# Patient Record
Sex: FEMALE | Race: WHITE | NOT HISPANIC OR LATINO | Employment: FULL TIME | URBAN - METROPOLITAN AREA
[De-identification: names, ages, dates, MRNs, and addresses within clinical notes are randomized per-mention and may not be internally consistent; named-entity substitution may affect disease eponyms.]

---

## 2023-10-02 ENCOUNTER — OFFICE VISIT (OUTPATIENT)
Dept: PRIMARY CARE | Facility: CLINIC | Age: 53
End: 2023-10-02
Payer: COMMERCIAL

## 2023-10-02 VITALS — DIASTOLIC BLOOD PRESSURE: 76 MMHG | HEART RATE: 76 BPM | RESPIRATION RATE: 14 BRPM | SYSTOLIC BLOOD PRESSURE: 126 MMHG

## 2023-10-02 DIAGNOSIS — M25.561 CHRONIC PAIN OF RIGHT KNEE: ICD-10-CM

## 2023-10-02 DIAGNOSIS — G89.29 CHRONIC PAIN OF RIGHT KNEE: ICD-10-CM

## 2023-10-02 DIAGNOSIS — B35.1 ONYCHOMYCOSIS: Primary | ICD-10-CM

## 2023-10-02 DIAGNOSIS — J01.00 ACUTE NON-RECURRENT MAXILLARY SINUSITIS: ICD-10-CM

## 2023-10-02 PROCEDURE — 96372 THER/PROPH/DIAG INJ SC/IM: CPT | Performed by: FAMILY MEDICINE

## 2023-10-02 PROCEDURE — 99214 OFFICE O/P EST MOD 30 MIN: CPT | Performed by: FAMILY MEDICINE

## 2023-10-02 RX ORDER — TERBINAFINE HYDROCHLORIDE 250 MG/1
250 TABLET ORAL DAILY
Qty: 28 TABLET | Refills: 1 | Status: SHIPPED | OUTPATIENT
Start: 2023-10-02 | End: 2024-01-24 | Stop reason: ALTCHOICE

## 2023-10-02 RX ORDER — TRIAMCINOLONE ACETONIDE 40 MG/ML
40 INJECTION, SUSPENSION INTRA-ARTICULAR; INTRAMUSCULAR ONCE
Status: COMPLETED | OUTPATIENT
Start: 2023-10-02 | End: 2023-10-02

## 2023-10-02 RX ORDER — MELOXICAM 15 MG/1
15 TABLET ORAL DAILY
Qty: 30 TABLET | Refills: 11 | Status: SHIPPED | OUTPATIENT
Start: 2023-10-02 | End: 2024-10-01

## 2023-10-02 RX ORDER — AMOXICILLIN AND CLAVULANATE POTASSIUM 875; 125 MG/1; MG/1
875 TABLET, FILM COATED ORAL 2 TIMES DAILY
Qty: 20 TABLET | Refills: 0 | Status: SHIPPED | OUTPATIENT
Start: 2023-10-02 | End: 2024-01-24 | Stop reason: ALTCHOICE

## 2023-10-02 RX ORDER — TERBINAFINE HYDROCHLORIDE 250 MG/1
250 TABLET ORAL DAILY
Qty: 14 TABLET | Refills: 0 | Status: SHIPPED | OUTPATIENT
Start: 2023-10-02 | End: 2023-10-02 | Stop reason: ALTCHOICE

## 2023-10-02 RX ADMIN — TRIAMCINOLONE ACETONIDE 40 MG: 40 INJECTION, SUSPENSION INTRA-ARTICULAR; INTRAMUSCULAR at 22:36

## 2023-10-02 NOTE — PROGRESS NOTES
Subjective   Patient ID: Sydney Juares is a 52 y.o. female who presents for sinus,     HPI   Patient started to have sinus congestion, facial ache, yellowish nasal discharge and mild dull headache 7 days ago. Patient also had cough with yellow phlegm. Normal hearing. No body aches, loss of smell/tastes,  fever, chills. Patient denies having shortness of breath, wheezing, chest pain, heart palpitation. No  nausea, vomiting or abdominal pain. Patient had normal appetite. No stiff neck, dizziness or imbalance. Symptoms persisted today. B/l toenail fungal infection improved slight;y with terbinafine for 4 weeks.  No abd pain or jaundice. Rt knee dull pain was partially controlled with mobic. No GI upset. Pt requested a cortisone shot for pain relief.         Review of Systems    Objective   /76   Pulse 76   Resp 14     Physical Exam  Mild distress. Eyes: EMOI, No conjunctival erythema. Left  maxillary  area tenderness upon palpation. There was yellowish nasal discharge. Mildly erythematous pharynx. No cervical lymph node tenderness or enlargement. Neck is supple. Lungs: CTA b/l, Heart: RRR. Abdomen: soft, no tenderness. Bowel sound: normal. Mild tenderness at rt knee, no effusion or warmth. Patient walks with a good balance. + b/l toenail fungal infection     Assessment/Plan   Problem List Items Addressed This Visit             ICD-10-CM    Acute maxillary sinusitis J01.00     Acute sinusitis: Antibiotics as above. Saline nasal spray. Tylenol prn for fever or ache. Increase fluid intake. If symptoms do not improve in 3-4  days, call office.           Relevant Medications    amoxicillin-pot clavulanate (Augmentin) 875-125 mg tablet    Onychomycosis - Primary B35.1     Terbinafine as dir. Caution of liver damage. No eoth or otc meds. Rtc for cpe         Relevant Medications    terbinafine (LamISIL) 250 mg tablet    Chronic pain of right knee M25.561, G89.29     Partially controlled. Cont mobic. Kenolog 40mg IM  for relief. Call office if symptoms do not improve  in 3 days           Relevant Medications    meloxicam (Mobic) 15 mg tablet    triamcinolone acetonide (Kenalog-40) injection 40 mg

## 2023-10-03 NOTE — ASSESSMENT & PLAN NOTE
Acute sinusitis: Antibiotics as above. Saline nasal spray. Tylenol prn for fever or ache. Increase fluid intake. If symptoms do not improve in 3-4  days, call office.

## 2023-10-03 NOTE — ASSESSMENT & PLAN NOTE
Partially controlled. Cont mobic. Kenolog 40mg IM for relief. Call office if symptoms do not improve  in 3 days

## 2024-01-24 ENCOUNTER — OFFICE VISIT (OUTPATIENT)
Dept: PRIMARY CARE | Facility: CLINIC | Age: 54
End: 2024-01-24
Payer: COMMERCIAL

## 2024-01-24 VITALS — RESPIRATION RATE: 14 BRPM | DIASTOLIC BLOOD PRESSURE: 78 MMHG | HEART RATE: 78 BPM | SYSTOLIC BLOOD PRESSURE: 138 MMHG

## 2024-01-24 DIAGNOSIS — J45.30 MILD PERSISTENT REACTIVE AIRWAY DISEASE WITHOUT COMPLICATION (HHS-HCC): Primary | ICD-10-CM

## 2024-01-24 DIAGNOSIS — Z12.11 ENCOUNTER FOR SCREENING FOR MALIGNANT NEOPLASM OF COLON: ICD-10-CM

## 2024-01-24 PROCEDURE — 96372 THER/PROPH/DIAG INJ SC/IM: CPT | Performed by: FAMILY MEDICINE

## 2024-01-24 PROCEDURE — 99213 OFFICE O/P EST LOW 20 MIN: CPT | Performed by: FAMILY MEDICINE

## 2024-01-24 RX ORDER — TRIAMCINOLONE ACETONIDE 40 MG/ML
80 INJECTION, SUSPENSION INTRA-ARTICULAR; INTRAMUSCULAR ONCE
Status: COMPLETED | OUTPATIENT
Start: 2024-01-24 | End: 2024-01-24

## 2024-01-24 RX ORDER — ALBUTEROL SULFATE 90 UG/1
2 AEROSOL, METERED RESPIRATORY (INHALATION) EVERY 4 HOURS PRN
Qty: 8.5 G | Refills: 0 | Status: SHIPPED | OUTPATIENT
Start: 2024-01-24 | End: 2025-01-23

## 2024-01-24 RX ORDER — CEFDINIR 300 MG/1
300 CAPSULE ORAL 2 TIMES DAILY
Qty: 20 CAPSULE | Refills: 0 | Status: SHIPPED | OUTPATIENT
Start: 2024-01-24 | End: 2024-02-03

## 2024-01-24 RX ORDER — PREDNISONE 20 MG/1
40 TABLET ORAL DAILY
Qty: 10 TABLET | Refills: 0 | Status: SHIPPED | OUTPATIENT
Start: 2024-01-24

## 2024-01-24 RX ADMIN — TRIAMCINOLONE ACETONIDE 80 MG: 40 INJECTION, SUSPENSION INTRA-ARTICULAR; INTRAMUSCULAR at 09:16

## 2024-01-24 NOTE — ASSESSMENT & PLAN NOTE
Kenolog  80mg IM,  Start antibiotics and albuterol inhaler  as above. Prednisone 40mg po qd for 5 days. call office if symptoms do not improve in 3-4 days.

## 2024-01-24 NOTE — PROGRESS NOTES
Subjective   Patient ID: Sydney Juares is a 53 y.o. female who presents for sinus, cough and wheezing for 3 weeks    HPI   Patient started to have sinus congestion, yellowish nasal discharge and mild dull headache 3 weeks ago. Patient also had wheezing, cough with yellow phlegm. Normal hearing. No body aches, loss of smell/tastes,  fever, chills. Patient denies having shortness of breath, wheezing, chest pain, heart palpitation. No  nausea, vomiting or abdominal pain. Patient had normal appetite. No stiff neck, dizziness or imbalance. Symptoms persisted today.         Review of Systems    Objective   /78   Pulse 78   Resp 14     Physical Exam  No  distress, well groomed, eyes: PERRLA, No sclera icterus. No sinus tenderness, + yellow nasal discharge, neck: supple, no cervical lymphadenopathy, lungs: wheezing b/l, no rales, heart: RRR, cap refill was less than 2 secs, no cyanosis, clubbing or LE edema, abd: soft, no tenderness, BS+, Good balance.     Assessment/Plan   Problem List Items Addressed This Visit             ICD-10-CM    Mild persistent reactive airway disease without complication - Primary J45.30     Kenolog  80mg IM,  Start antibiotics and albuterol inhaler  as above. Prednisone 40mg po qd for 5 days. call office if symptoms do not improve in 3-4 days.           Relevant Medications    cefdinir (Omnicef) 300 mg capsule    albuterol (ProAir HFA) 90 mcg/actuation inhaler    triamcinolone acetonide (Kenalog-40) injection 80 mg    predniSONE (Deltasone) 20 mg tablet     Other Visit Diagnoses         Codes    Encounter for screening for malignant neoplasm of colon     Z12.11    Relevant Orders    Cologuard® colon cancer screening

## 2024-05-22 ENCOUNTER — OFFICE VISIT (OUTPATIENT)
Dept: PRIMARY CARE | Facility: CLINIC | Age: 54
End: 2024-05-22
Payer: COMMERCIAL

## 2024-05-22 VITALS — RESPIRATION RATE: 14 BRPM | HEART RATE: 66 BPM | DIASTOLIC BLOOD PRESSURE: 68 MMHG | SYSTOLIC BLOOD PRESSURE: 124 MMHG

## 2024-05-22 DIAGNOSIS — Z12.31 ENCOUNTER FOR SCREENING MAMMOGRAM FOR MALIGNANT NEOPLASM OF BREAST: ICD-10-CM

## 2024-05-22 DIAGNOSIS — J01.00 ACUTE NON-RECURRENT MAXILLARY SINUSITIS: ICD-10-CM

## 2024-05-22 DIAGNOSIS — Z12.11 COLON CANCER SCREENING: Primary | ICD-10-CM

## 2024-05-22 PROCEDURE — 99213 OFFICE O/P EST LOW 20 MIN: CPT | Performed by: FAMILY MEDICINE

## 2024-05-22 RX ORDER — DOXYCYCLINE 100 MG/1
100 CAPSULE ORAL 2 TIMES DAILY
Qty: 20 CAPSULE | Refills: 0 | Status: SHIPPED | OUTPATIENT
Start: 2024-05-22

## 2024-05-22 RX ORDER — BENZONATATE 200 MG/1
200 CAPSULE ORAL 3 TIMES DAILY PRN
Qty: 42 CAPSULE | Refills: 0 | Status: SHIPPED | OUTPATIENT
Start: 2024-05-22 | End: 2024-06-21

## 2024-05-22 NOTE — PROGRESS NOTES
Subjective   Patient ID: Sydney Juares is a 53 y.o. female who presents for sinus  HPI   Patient started to have sinus congestion, facial ache, yellowish nasal discharge and mild dull headache 14 days ago. Patient also had cough with yellow phlegm. Normal hearing. No body aches, loss of smell/tastes,  fever, chills. Patient denies having shortness of breath, wheezing, chest pain, heart palpitation. No  nausea, vomiting or abdominal pain. Patient had normal appetite. No stiff neck, dizziness or imbalance. Symptoms persisted today.         Review of Systems    Objective   /68   Pulse 66   Resp 14     Physical Exam  Mild distress. Eyes: EMOI, No conjunctival erythema. Left  maxillary  area tenderness upon palpation. There was yellowish nasal discharge. Mildly erythematous pharynx. No cervical lymph node tenderness or enlargement. Neck is supple. Lungs: CTA b/l, Heart: RRR. Abdomen: soft, no tenderness. Bowel sound: normal. Patient walks with a good balance.      Assessment/Plan     Acute sinusitis: Antibiotics as above. Saline nasal spray. Tylenol prn for fever or ache. Increase fluid intake. If symptoms do not improve in 3-4  days, call office.  Dc smoking. Pt declined ldct

## 2024-06-19 ENCOUNTER — OFFICE VISIT (OUTPATIENT)
Dept: PRIMARY CARE | Facility: CLINIC | Age: 54
End: 2024-06-19
Payer: COMMERCIAL

## 2024-06-19 VITALS — SYSTOLIC BLOOD PRESSURE: 132 MMHG | DIASTOLIC BLOOD PRESSURE: 76 MMHG | RESPIRATION RATE: 14 BRPM | HEART RATE: 76 BPM

## 2024-06-19 DIAGNOSIS — J45.30 MILD PERSISTENT REACTIVE AIRWAY DISEASE WITHOUT COMPLICATION (HHS-HCC): Primary | ICD-10-CM

## 2024-06-19 PROCEDURE — 99213 OFFICE O/P EST LOW 20 MIN: CPT | Performed by: FAMILY MEDICINE

## 2024-06-19 PROCEDURE — 96372 THER/PROPH/DIAG INJ SC/IM: CPT | Performed by: FAMILY MEDICINE

## 2024-06-19 RX ORDER — TRIAMCINOLONE ACETONIDE 40 MG/ML
80 INJECTION, SUSPENSION INTRA-ARTICULAR; INTRAMUSCULAR ONCE
Status: COMPLETED | OUTPATIENT
Start: 2024-06-19 | End: 2024-06-19

## 2024-06-19 RX ORDER — AMOXICILLIN AND CLAVULANATE POTASSIUM 875; 125 MG/1; MG/1
875 TABLET, FILM COATED ORAL 2 TIMES DAILY
Qty: 20 TABLET | Refills: 0 | Status: SHIPPED | OUTPATIENT
Start: 2024-06-19

## 2024-06-19 RX ORDER — ALBUTEROL SULFATE 90 UG/1
2 AEROSOL, METERED RESPIRATORY (INHALATION) EVERY 4 HOURS PRN
Qty: 8 G | Refills: 1 | Status: SHIPPED | OUTPATIENT
Start: 2024-06-19 | End: 2025-06-19

## 2024-06-19 NOTE — PROGRESS NOTES
Subjective   Patient ID: Sydney Juares is a 53 y.o. female who presents for cough and wheezing for 3 weeks    HPI   Patient started to have sob, cough, yellow phlegm and wheezing 3 weeks ago.  No fever but occ  chills. No Cp, heart palpitation, hemoptysis, HA, dizziness, neck stiffness or confusion. Pt has had normal appetite. Symptoms persisted today.    Review of Systems    Objective   /76   Pulse 76   Resp 14     Physical Exam  Mild distress, well groomed, eyes: PERRLA, No sclera icterus. No sinus tenderness or nasal discharge, neck: supple, no cervical lymphadenopathy, lungs: wheezing b/l, no rales, heart: RRR, cap refill was less than 2 secs, no cyanosis, clubbing or LE edema, abd: soft, no tenderness, BS+, Good balance.     Assessment/Plan   Problem List Items Addressed This Visit             ICD-10-CM    Mild persistent reactive airway disease without complication ([facility]) - Primary J45.30     Kenolog  80mg IM. Start antibiotics and albuterol inhaler  as above. call office if symptoms do not improve in 3-4 days.           Relevant Medications    triamcinolone acetonide (Kenalog-40) injection 80 mg (Start on 6/19/2024 12:00 PM)    amoxicillin-pot clavulanate (Augmentin) 875-125 mg tablet    albuterol (Ventolin HFA) 90 mcg/actuation inhaler          
THIN

## 2024-06-19 NOTE — ASSESSMENT & PLAN NOTE
Kenolog  80mg IM. Start antibiotics and albuterol inhaler  as above. call office if symptoms do not improve in 3-4 days.

## 2024-06-26 ENCOUNTER — HOSPITAL ENCOUNTER (OUTPATIENT)
Dept: RADIOLOGY | Facility: HOSPITAL | Age: 54
Discharge: HOME | End: 2024-06-26
Payer: COMMERCIAL

## 2024-06-26 VITALS — WEIGHT: 270 LBS | BODY MASS INDEX: 38.65 KG/M2 | HEIGHT: 70 IN

## 2024-06-26 DIAGNOSIS — Z12.31 ENCOUNTER FOR SCREENING MAMMOGRAM FOR MALIGNANT NEOPLASM OF BREAST: ICD-10-CM

## 2024-06-26 PROCEDURE — 77067 SCR MAMMO BI INCL CAD: CPT | Performed by: RADIOLOGY

## 2024-06-26 PROCEDURE — 77067 SCR MAMMO BI INCL CAD: CPT

## 2024-06-26 PROCEDURE — 77063 BREAST TOMOSYNTHESIS BI: CPT | Performed by: RADIOLOGY

## 2024-09-23 ENCOUNTER — OFFICE VISIT (OUTPATIENT)
Dept: PRIMARY CARE | Facility: CLINIC | Age: 54
End: 2024-09-23
Payer: COMMERCIAL

## 2024-09-23 VITALS — DIASTOLIC BLOOD PRESSURE: 78 MMHG | HEART RATE: 76 BPM | SYSTOLIC BLOOD PRESSURE: 132 MMHG

## 2024-09-23 DIAGNOSIS — Z12.11 COLON CANCER SCREENING: ICD-10-CM

## 2024-09-23 DIAGNOSIS — R42 DIZZINESSES: Primary | ICD-10-CM

## 2024-09-23 PROCEDURE — 99213 OFFICE O/P EST LOW 20 MIN: CPT | Performed by: FAMILY MEDICINE

## 2024-09-23 RX ORDER — MECLIZINE HYDROCHLORIDE 25 MG/1
25 TABLET ORAL 3 TIMES DAILY PRN
Qty: 30 TABLET | Refills: 1 | Status: SHIPPED | OUTPATIENT
Start: 2024-09-23 | End: 2025-09-23

## 2024-09-24 ENCOUNTER — LAB (OUTPATIENT)
Dept: LAB | Facility: LAB | Age: 54
End: 2024-09-24
Payer: COMMERCIAL

## 2024-09-24 DIAGNOSIS — R42 DIZZINESSES: ICD-10-CM

## 2024-09-24 LAB
ALBUMIN SERPL BCP-MCNC: 4.1 G/DL (ref 3.4–5)
ALP SERPL-CCNC: 64 U/L (ref 33–110)
ALT SERPL W P-5'-P-CCNC: 17 U/L (ref 7–45)
ANION GAP SERPL CALC-SCNC: 11 MMOL/L (ref 10–20)
AST SERPL W P-5'-P-CCNC: 15 U/L (ref 9–39)
BILIRUB SERPL-MCNC: 0.4 MG/DL (ref 0–1.2)
BUN SERPL-MCNC: 11 MG/DL (ref 6–23)
CALCIUM SERPL-MCNC: 9.1 MG/DL (ref 8.6–10.3)
CHLORIDE SERPL-SCNC: 104 MMOL/L (ref 98–107)
CO2 SERPL-SCNC: 30 MMOL/L (ref 21–32)
CREAT SERPL-MCNC: 0.73 MG/DL (ref 0.5–1.05)
CRP SERPL-MCNC: 0.47 MG/DL
EGFRCR SERPLBLD CKD-EPI 2021: >90 ML/MIN/1.73M*2
ERYTHROCYTE [DISTWIDTH] IN BLOOD BY AUTOMATED COUNT: 12.2 % (ref 11.5–14.5)
ERYTHROCYTE [SEDIMENTATION RATE] IN BLOOD BY WESTERGREN METHOD: 6 MM/H (ref 0–30)
GLUCOSE SERPL-MCNC: 91 MG/DL (ref 74–99)
HCT VFR BLD AUTO: 41.8 % (ref 36–46)
HGB BLD-MCNC: 14.3 G/DL (ref 12–16)
MCH RBC QN AUTO: 34 PG (ref 26–34)
MCHC RBC AUTO-ENTMCNC: 34.2 G/DL (ref 32–36)
MCV RBC AUTO: 99 FL (ref 80–100)
NRBC BLD-RTO: 0 /100 WBCS (ref 0–0)
PLATELET # BLD AUTO: 224 X10*3/UL (ref 150–450)
POTASSIUM SERPL-SCNC: 4.5 MMOL/L (ref 3.5–5.3)
PROT SERPL-MCNC: 6.7 G/DL (ref 6.4–8.2)
PROT SERPL-MCNC: 6.9 G/DL (ref 6.4–8.2)
RBC # BLD AUTO: 4.21 X10*6/UL (ref 4–5.2)
SODIUM SERPL-SCNC: 140 MMOL/L (ref 136–145)
TSH SERPL-ACNC: 1.82 MIU/L (ref 0.44–3.98)
WBC # BLD AUTO: 5.5 X10*3/UL (ref 4.4–11.3)

## 2024-09-24 PROCEDURE — 36415 COLL VENOUS BLD VENIPUNCTURE: CPT

## 2024-09-24 PROCEDURE — 86225 DNA ANTIBODY NATIVE: CPT

## 2024-09-24 PROCEDURE — 80053 COMPREHEN METABOLIC PANEL: CPT

## 2024-09-24 PROCEDURE — 86140 C-REACTIVE PROTEIN: CPT

## 2024-09-24 PROCEDURE — 85027 COMPLETE CBC AUTOMATED: CPT

## 2024-09-24 PROCEDURE — 85652 RBC SED RATE AUTOMATED: CPT

## 2024-09-24 PROCEDURE — 86038 ANTINUCLEAR ANTIBODIES: CPT

## 2024-09-24 PROCEDURE — 84165 PROTEIN E-PHORESIS SERUM: CPT | Performed by: FAMILY MEDICINE

## 2024-09-24 PROCEDURE — 84155 ASSAY OF PROTEIN SERUM: CPT

## 2024-09-24 PROCEDURE — 84165 PROTEIN E-PHORESIS SERUM: CPT

## 2024-09-24 PROCEDURE — 84443 ASSAY THYROID STIM HORMONE: CPT

## 2024-09-24 PROCEDURE — 86235 NUCLEAR ANTIGEN ANTIBODY: CPT

## 2024-09-24 NOTE — PROGRESS NOTES
Subjective   Patient ID: Sydney Juares is a 53 y.o. female who presents for dizziness and occ HA for many mos    HPI   Intermittent dizziness and occ HA for many mos. The dizziness lasted a few min and resolved spontaneously. Pt denies any triggering movement that caused the dizziness. Pt also had intermittent dull HA which lasted a few hrs each episode. No blurry vision, hearing loss, tinnitus, weakness or numbness. No incontinence. No imbalance or falls. Normal appetite.   Review of Systems    Objective   /78   Pulse 76     Physical Exam  No  distress. Eyes: PERRLA, EMOI, No sclera icterus, conjunctival erythema. No nasal discharge. Normal hearing, TM and  pharynx. No cervical lymph node tenderness or enlargement. Neck is supple. Lungs: CTA b/l, Heart: RRR. No LE edema, Abdomen: soft, no tenderness. Bowel sound: normal. good balance. Cnii-XII were grossly intact. Good mood     Assessment/Plan   Assessment & Plan  Colon cancer screening    Orders:    Cologuard® colon cancer screening; Future    Cologuard® colon cancer screening    Dizzinesses  Persistent. Possible bppv. Antivert as dir. Regular exercise. Pt also had HA. Neurology eval. Check labs for eval. Call office if symptoms do not improve in a few days. dc smoking    Orders:    TSH with reflex to Free T4 if abnormal; Future    Comprehensive Metabolic Panel; Future    CBC; Future    Sedimentation Rate; Future    Serum Protein Electrophoresis; Future    C-Reactive Protein; Future    SELIN with Reflex to MAUREEN; Future    meclizine (Antivert) 25 mg tablet; Take 1 tablet (25 mg) by mouth 3 times a day as needed for dizziness.    Referral to Neurology; Future         Patient is not sick today. Patient is not anxious about getting a shot today. Patient has never had Guillain Barré syndrome. Patient has never felt dizzy or faint before, during, or after a shot. Patient has never had a serious reaction to influenza vaccine in the past.  Patient has never had an  allergy to an ingredient of influenza vaccine.  Flu shot was given via IM today.

## 2024-09-25 LAB
ANA PATTERN: ABNORMAL
ANA SER QL HEP2 SUBST: POSITIVE
ANA TITR SER IF: ABNORMAL {TITER}
CENTROMERE B AB SER-ACNC: <0.2 AI
CHROMATIN AB SERPL-ACNC: <0.2 AI
DSDNA AB SER-ACNC: <1 IU/ML
ENA JO1 AB SER QL IA: <0.2 AI
ENA RNP AB SER IA-ACNC: <0.2 AI
ENA SCL70 AB SER QL IA: <0.2 AI
ENA SM AB SER IA-ACNC: <0.2 AI
ENA SM+RNP AB SER QL IA: <0.2 AI
ENA SS-A AB SER IA-ACNC: <0.2 AI
ENA SS-B AB SER IA-ACNC: <0.2 AI
RIBOSOMAL P AB SER-ACNC: <0.2 AI

## 2024-09-26 DIAGNOSIS — R76.8 ELEVATED ANTINUCLEAR ANTIBODY (ANA) LEVEL: Primary | ICD-10-CM

## 2024-09-26 PROBLEM — R42 DIZZINESSES: Status: ACTIVE | Noted: 2024-09-26

## 2024-09-26 PROBLEM — J01.00 ACUTE MAXILLARY SINUSITIS: Status: RESOLVED | Noted: 2023-10-02 | Resolved: 2024-09-26

## 2024-09-26 LAB
ALBUMIN: 4.1 G/DL (ref 3.4–5)
ALPHA 1 GLOBULIN: 0.3 G/DL (ref 0.2–0.6)
ALPHA 2 GLOBULIN: 0.9 G/DL (ref 0.4–1.1)
BETA GLOBULIN: 0.8 G/DL (ref 0.5–1.2)
GAMMA GLOBULIN: 0.8 G/DL (ref 0.5–1.4)
PATH REVIEW-SERUM PROTEIN ELECTROPHORESIS: NORMAL
PROTEIN ELECTROPHORESIS COMMENT: NORMAL

## 2024-09-26 PROCEDURE — 90656 IIV3 VACC NO PRSV 0.5 ML IM: CPT | Performed by: FAMILY MEDICINE

## 2024-09-26 PROCEDURE — 90471 IMMUNIZATION ADMIN: CPT | Performed by: FAMILY MEDICINE

## 2024-09-26 NOTE — ASSESSMENT & PLAN NOTE
Persistent. Possible bppv. Antivert as dir. Regular exercise. Pt also had HA. Neurology eval. Check labs for eval. Call office if symptoms do not improve in a few days. dc smoking    Orders:    TSH with reflex to Free T4 if abnormal; Future    Comprehensive Metabolic Panel; Future    CBC; Future    Sedimentation Rate; Future    Serum Protein Electrophoresis; Future    C-Reactive Protein; Future    SELIN with Reflex to MAUREEN; Future    meclizine (Antivert) 25 mg tablet; Take 1 tablet (25 mg) by mouth 3 times a day as needed for dizziness.    Referral to Neurology; Future

## 2024-10-28 ENCOUNTER — OFFICE VISIT (OUTPATIENT)
Dept: PRIMARY CARE | Facility: CLINIC | Age: 54
End: 2024-10-28
Payer: COMMERCIAL

## 2024-10-28 VITALS — DIASTOLIC BLOOD PRESSURE: 68 MMHG | HEART RATE: 76 BPM | SYSTOLIC BLOOD PRESSURE: 132 MMHG

## 2024-10-28 DIAGNOSIS — J01.00 ACUTE NON-RECURRENT MAXILLARY SINUSITIS: Primary | ICD-10-CM

## 2024-10-28 PROCEDURE — 99213 OFFICE O/P EST LOW 20 MIN: CPT | Performed by: FAMILY MEDICINE

## 2024-10-28 RX ORDER — ALBUTEROL SULFATE 90 UG/1
2 INHALANT RESPIRATORY (INHALATION) EVERY 4 HOURS PRN
Qty: 8 G | Refills: 0 | Status: SHIPPED | OUTPATIENT
Start: 2024-10-28 | End: 2025-10-28

## 2024-10-28 RX ORDER — DOXYCYCLINE 100 MG/1
100 CAPSULE ORAL 2 TIMES DAILY
Qty: 20 CAPSULE | Refills: 0 | Status: SHIPPED | OUTPATIENT
Start: 2024-10-28

## 2024-10-29 ENCOUNTER — HOSPITAL ENCOUNTER (OUTPATIENT)
Dept: RADIOLOGY | Facility: CLINIC | Age: 54
Discharge: HOME | End: 2024-10-29
Payer: COMMERCIAL

## 2024-10-29 ENCOUNTER — LAB (OUTPATIENT)
Dept: LAB | Facility: LAB | Age: 54
End: 2024-10-29
Payer: COMMERCIAL

## 2024-10-29 ENCOUNTER — APPOINTMENT (OUTPATIENT)
Dept: RHEUMATOLOGY | Facility: CLINIC | Age: 54
End: 2024-10-29
Payer: COMMERCIAL

## 2024-10-29 VITALS
HEART RATE: 94 BPM | BODY MASS INDEX: 38.74 KG/M2 | DIASTOLIC BLOOD PRESSURE: 74 MMHG | SYSTOLIC BLOOD PRESSURE: 153 MMHG | TEMPERATURE: 97.7 F | WEIGHT: 270 LBS

## 2024-10-29 DIAGNOSIS — R76.8 ELEVATED ANTINUCLEAR ANTIBODY (ANA) LEVEL: ICD-10-CM

## 2024-10-29 LAB
APPEARANCE UR: CLEAR
BILIRUB UR STRIP.AUTO-MCNC: NEGATIVE MG/DL
C3 SERPL-MCNC: 194 MG/DL (ref 87–200)
C4 SERPL-MCNC: 38 MG/DL (ref 10–50)
CCP IGG SERPL-ACNC: <1 U/ML
COLOR UR: YELLOW
CREAT UR-MCNC: 185.4 MG/DL (ref 20–320)
GLUCOSE UR STRIP.AUTO-MCNC: NORMAL MG/DL
KETONES UR STRIP.AUTO-MCNC: NEGATIVE MG/DL
LEUKOCYTE ESTERASE UR QL STRIP.AUTO: NEGATIVE
MUCOUS THREADS #/AREA URNS AUTO: NORMAL /LPF
NITRITE UR QL STRIP.AUTO: NEGATIVE
PH UR STRIP.AUTO: 5.5 [PH]
PROT UR STRIP.AUTO-MCNC: NORMAL MG/DL
PROT UR-ACNC: 13 MG/DL (ref 5–24)
PROT/CREAT UR: 0.07 MG/MG CREAT (ref 0–0.17)
RBC # UR STRIP.AUTO: NEGATIVE /UL
RBC #/AREA URNS AUTO: NORMAL /HPF
RHEUMATOID FACT SER NEPH-ACNC: <10 IU/ML (ref 0–15)
SP GR UR STRIP.AUTO: 1.03
SQUAMOUS #/AREA URNS AUTO: NORMAL /HPF
UROBILINOGEN UR STRIP.AUTO-MCNC: NORMAL MG/DL
WBC #/AREA URNS AUTO: NORMAL /HPF

## 2024-10-29 PROCEDURE — 73120 X-RAY EXAM OF HAND: CPT | Mod: 50

## 2024-10-29 PROCEDURE — 99204 OFFICE O/P NEW MOD 45 MIN: CPT | Performed by: STUDENT IN AN ORGANIZED HEALTH CARE EDUCATION/TRAINING PROGRAM

## 2024-10-29 PROCEDURE — 86200 CCP ANTIBODY: CPT

## 2024-10-29 PROCEDURE — 81001 URINALYSIS AUTO W/SCOPE: CPT

## 2024-10-29 PROCEDURE — 36415 COLL VENOUS BLD VENIPUNCTURE: CPT

## 2024-10-29 PROCEDURE — 82570 ASSAY OF URINE CREATININE: CPT

## 2024-10-29 PROCEDURE — 86431 RHEUMATOID FACTOR QUANT: CPT

## 2024-10-29 PROCEDURE — 84156 ASSAY OF PROTEIN URINE: CPT

## 2024-10-29 PROCEDURE — 86160 COMPLEMENT ANTIGEN: CPT

## 2024-11-10 DIAGNOSIS — M25.561 CHRONIC PAIN OF RIGHT KNEE: Primary | ICD-10-CM

## 2024-11-10 DIAGNOSIS — G89.29 CHRONIC PAIN OF RIGHT KNEE: Primary | ICD-10-CM

## 2024-11-10 RX ORDER — MELOXICAM 15 MG/1
15 TABLET ORAL DAILY
Qty: 15 TABLET | Refills: 0 | Status: SHIPPED | OUTPATIENT
Start: 2024-11-10

## 2024-11-10 RX ORDER — MELOXICAM 15 MG/1
1 TABLET ORAL DAILY
COMMUNITY
Start: 2022-10-13 | End: 2024-11-10 | Stop reason: SDUPTHER

## 2024-12-13 ENCOUNTER — TELEPHONE (OUTPATIENT)
Dept: PRIMARY CARE | Facility: CLINIC | Age: 54
End: 2024-12-13
Payer: COMMERCIAL

## 2024-12-13 DIAGNOSIS — J45.30 MILD PERSISTENT REACTIVE AIRWAY DISEASE WITHOUT COMPLICATION (HHS-HCC): ICD-10-CM

## 2024-12-13 NOTE — TELEPHONE ENCOUNTER
meloxicam (Mobic) 15 mg tablet//Take 1 tablet (15 mg) by mouth once daily.    [id number] [place], [place]   Pt stated she is taking RX everyday.

## 2024-12-20 RX ORDER — ALBUTEROL SULFATE 90 UG/1
2 INHALANT RESPIRATORY (INHALATION) EVERY 4 HOURS PRN
Qty: 8.5 G | Refills: 0 | Status: SHIPPED | OUTPATIENT
Start: 2024-12-20 | End: 2025-12-20

## 2024-12-23 ENCOUNTER — APPOINTMENT (OUTPATIENT)
Dept: NEUROLOGY | Facility: HOSPITAL | Age: 54
End: 2024-12-23
Payer: COMMERCIAL

## 2025-01-13 ENCOUNTER — APPOINTMENT (OUTPATIENT)
Dept: PRIMARY CARE | Facility: CLINIC | Age: 55
End: 2025-01-13
Payer: COMMERCIAL

## 2025-01-13 VITALS — DIASTOLIC BLOOD PRESSURE: 76 MMHG | SYSTOLIC BLOOD PRESSURE: 134 MMHG

## 2025-01-13 DIAGNOSIS — F41.1 GAD (GENERALIZED ANXIETY DISORDER): ICD-10-CM

## 2025-01-13 DIAGNOSIS — Z12.11 COLON CANCER SCREENING: ICD-10-CM

## 2025-01-13 DIAGNOSIS — M79.89 LEG SWELLING: Primary | ICD-10-CM

## 2025-01-13 DIAGNOSIS — G89.29 CHRONIC PAIN OF RIGHT KNEE: ICD-10-CM

## 2025-01-13 DIAGNOSIS — M25.561 CHRONIC PAIN OF RIGHT KNEE: ICD-10-CM

## 2025-01-13 DIAGNOSIS — M25.50 MULTIPLE JOINT PAIN: ICD-10-CM

## 2025-01-13 PROBLEM — B35.1 ONYCHOMYCOSIS: Status: RESOLVED | Noted: 2023-10-02 | Resolved: 2025-01-13

## 2025-01-13 PROCEDURE — 99214 OFFICE O/P EST MOD 30 MIN: CPT | Performed by: FAMILY MEDICINE

## 2025-01-13 RX ORDER — DULOXETIN HYDROCHLORIDE 30 MG/1
30 CAPSULE, DELAYED RELEASE ORAL 2 TIMES DAILY
Qty: 60 CAPSULE | Refills: 2 | Status: SHIPPED | OUTPATIENT
Start: 2025-01-13 | End: 2025-07-12

## 2025-01-13 RX ORDER — FUROSEMIDE 20 MG/1
20 TABLET ORAL DAILY
Qty: 30 TABLET | Refills: 2 | Status: SHIPPED | OUTPATIENT
Start: 2025-01-13 | End: 2026-01-13

## 2025-01-13 RX ORDER — MELOXICAM 15 MG/1
15 TABLET ORAL DAILY
Qty: 30 TABLET | Refills: 2 | Status: SHIPPED | OUTPATIENT
Start: 2025-01-13

## 2025-01-13 RX ORDER — MELOXICAM 15 MG/1
15 TABLET ORAL DAILY
Qty: 15 TABLET | Refills: 0 | Status: SHIPPED | OUTPATIENT
Start: 2025-01-13 | End: 2025-01-13 | Stop reason: SDUPTHER

## 2025-01-13 ASSESSMENT — PATIENT HEALTH QUESTIONNAIRE - PHQ9
SUM OF ALL RESPONSES TO PHQ9 QUESTIONS 1 AND 2: 0
2. FEELING DOWN, DEPRESSED OR HOPELESS: NOT AT ALL
1. LITTLE INTEREST OR PLEASURE IN DOING THINGS: NOT AT ALL

## 2025-01-13 NOTE — PROGRESS NOTES
Subjective   Patient ID: Sydney Juares is a 54 y.o. female who presents for fu    HPI   Pt cont to have multiple joint pain adore rt knee pain. No joint swelling. Muscle weakness or rashes, Mobic cont the pain partially well. LE swelling was worse lately. Lasix 20mg every day did not help.  No sob, cough, cp, pnd. ROSALINDA got worse lately. No depression.   Review of Systems    Objective   /76     Physical Exam  No distress, well groomed, looks anxious, No sclera icterus. normal respiration, lungs: CTA b/l, heart: RRR, no  jvd, + LE  edema, no calf tenderness, normal pedal pulses, abd: soft, no tenderness, BS+, mild  tenderness at rt knee and elbow, wrists and shoulders, no local warmth or erythema, good balance.    Assessment/Plan   Assessment & Plan  Leg swelling  Low salt diet. Increase lasix as dir. Call office if symptoms do not improve in a few days    Orders:    Comprehensive metabolic panel; Future    furosemide (Lasix) 20 mg tablet; Take 1 tablet (20 mg) by mouth once daily.    Multiple joint pain   start mobic as dir. caution of SE from mobic such as kidney damage and stomach bleeding. take mobic with foods. Recommend prilosec 20mg every day for stomach protection. call office if symptoms do not improve in 2 weeks or if pain gets worse.         Chronic pain of right knee  Mobic as dir  Orders:    meloxicam (Mobic) 15 mg tablet; Take 1 tablet (15 mg) by mouth once daily.    ROSALINDA (generalized anxiety disorder)  Start cymbalta. Call if hi/si occurs. Fu in 3 mos. Dc smoking    Orders:    DULoxetine (Cymbalta) 30 mg DR capsule; Take 1 capsule (30 mg) by mouth 2 times a day. Do not crush or chew.    Colon cancer screening    Orders:    Fecal Occult Blood Immunoassay; Future

## 2025-01-14 ENCOUNTER — OFFICE VISIT (OUTPATIENT)
Dept: NEUROLOGY | Facility: HOSPITAL | Age: 55
End: 2025-01-14
Payer: COMMERCIAL

## 2025-01-14 ENCOUNTER — LAB (OUTPATIENT)
Dept: LAB | Facility: LAB | Age: 55
End: 2025-01-14
Payer: COMMERCIAL

## 2025-01-14 VITALS
BODY MASS INDEX: 42.21 KG/M2 | HEART RATE: 88 BPM | DIASTOLIC BLOOD PRESSURE: 83 MMHG | RESPIRATION RATE: 18 BRPM | SYSTOLIC BLOOD PRESSURE: 135 MMHG | WEIGHT: 293 LBS

## 2025-01-14 DIAGNOSIS — R42 DIZZINESSES: ICD-10-CM

## 2025-01-14 DIAGNOSIS — M79.89 LEG SWELLING: ICD-10-CM

## 2025-01-14 DIAGNOSIS — G43.809 VESTIBULAR MIGRAINE: ICD-10-CM

## 2025-01-14 DIAGNOSIS — R42 DIZZINESSES: Primary | ICD-10-CM

## 2025-01-14 LAB
ALBUMIN SERPL BCP-MCNC: 4.1 G/DL (ref 3.4–5)
ALP SERPL-CCNC: 60 U/L (ref 33–110)
ALT SERPL W P-5'-P-CCNC: 15 U/L (ref 7–45)
ANION GAP SERPL CALC-SCNC: 10 MMOL/L (ref 10–20)
AST SERPL W P-5'-P-CCNC: 14 U/L (ref 9–39)
BILIRUB SERPL-MCNC: 0.4 MG/DL (ref 0–1.2)
BUN SERPL-MCNC: 13 MG/DL (ref 6–23)
CALCIUM SERPL-MCNC: 9.5 MG/DL (ref 8.6–10.3)
CHLORIDE SERPL-SCNC: 104 MMOL/L (ref 98–107)
CO2 SERPL-SCNC: 31 MMOL/L (ref 21–32)
CREAT SERPL-MCNC: 0.75 MG/DL (ref 0.5–1.05)
EGFRCR SERPLBLD CKD-EPI 2021: >90 ML/MIN/1.73M*2
GLUCOSE SERPL-MCNC: 85 MG/DL (ref 74–99)
POTASSIUM SERPL-SCNC: 4.6 MMOL/L (ref 3.5–5.3)
PROT SERPL-MCNC: 6.7 G/DL (ref 6.4–8.2)
SODIUM SERPL-SCNC: 140 MMOL/L (ref 136–145)
VIT B12 SERPL-MCNC: 289 PG/ML (ref 211–911)

## 2025-01-14 PROCEDURE — 80053 COMPREHEN METABOLIC PANEL: CPT

## 2025-01-14 PROCEDURE — 99214 OFFICE O/P EST MOD 30 MIN: CPT | Performed by: PSYCHIATRY & NEUROLOGY

## 2025-01-14 PROCEDURE — 82607 VITAMIN B-12: CPT

## 2025-01-14 PROCEDURE — 99204 OFFICE O/P NEW MOD 45 MIN: CPT | Performed by: PSYCHIATRY & NEUROLOGY

## 2025-01-14 ASSESSMENT — ENCOUNTER SYMPTOMS
DEPRESSION: 0
LOSS OF SENSATION IN FEET: 0
OCCASIONAL FEELINGS OF UNSTEADINESS: 1

## 2025-01-14 ASSESSMENT — COLUMBIA-SUICIDE SEVERITY RATING SCALE - C-SSRS
1. IN THE PAST MONTH, HAVE YOU WISHED YOU WERE DEAD OR WISHED YOU COULD GO TO SLEEP AND NOT WAKE UP?: NO
6. HAVE YOU EVER DONE ANYTHING, STARTED TO DO ANYTHING, OR PREPARED TO DO ANYTHING TO END YOUR LIFE?: NO
2. HAVE YOU ACTUALLY HAD ANY THOUGHTS OF KILLING YOURSELF?: NO

## 2025-01-14 NOTE — ASSESSMENT & PLAN NOTE
Start cymbalta. Call if hi/si occurs. Fu in 3 mos. Dc smoking    Orders:    DULoxetine (Cymbalta) 30 mg DR capsule; Take 1 capsule (30 mg) by mouth 2 times a day. Do not crush or chew.

## 2025-01-14 NOTE — PATIENT INSTRUCTIONS
Do MRI IAC wwo  Check B12 level--will try to add on to existing blood drawn this am  Try duloxetine  Suggested vestibular therapy    Rtc after testing

## 2025-01-14 NOTE — ASSESSMENT & PLAN NOTE
Mobic as dir  Orders:    meloxicam (Mobic) 15 mg tablet; Take 1 tablet (15 mg) by mouth once daily.

## 2025-01-14 NOTE — LETTER
"January 14, 2025     Gunjan Marcelo MD PhD  [address] [facility] [address]    Patient: Sydney Juares   YOB: 1970   Date of Visit: 1/14/2025       Dear Dr. Gunjan Marcelo MD PhD:    Thank you for referring Sydney Juares to me for evaluation. Below are my notes for this consultation.  If you have questions, please do not hesitate to call me. I look forward to following your patient along with you.       Sincerely,     Celestino Knott MD      CC: No Recipients  ______________________________________________________________________________________    In-clinic visit    Visit type: provider referral: Dr Marcelo    PCP: Gunjan Marcelo MD PhD.    Subjective    Sydney Juares is a 54 y.o. year old right handed female who presents with Dizziness.    Patient is accompanied by none.     HPI    Noticed dizziness sometime in summer 2024. 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 No LOC. Felt like will pass out. Dizziness kept going on. Was feeling will fall backwards.    Not lightheaded. Can't focus, have a real bad HA. Close eyes, will help. \"Dizzy medicine helps\". No spinning. Sometimes things in vision \"start to move\".     Thinks has gotten worse over time. Was out of work due to symptoms x2-3 weeks. Subsided. Then came back. Not daily now. Lasts 45 seconds to 2-3 minutes. Always with HA. Feels like eyes hurt and forehead hurts and temples. Pressure. (-) sound/light sensitivity. (+) nausea. Has ? Visual aura vs floaters. Never had migraines. Used to have HA a lot with menses.    Has not had eyes checked. Due for check-up--gets checked q2 years. No brain scan.    No new meds. Just prescribed Lasix and duloxetine by PCP yesterday--hasn't picked up yet.    No problems with hearing. No ringing. Had hx referred pain to R ear from dental work.    (+) asthma (?). No kidney stones. No heart problems. No glaucoma.    Smoker, 1ppd. No alcohol. No street drug use      Review of Systems   Constitutional:  " Negative for appetite change, chills and fever.   HENT:  Negative for ear pain and nosebleeds.    Eyes:  Negative for discharge and itching.   Respiratory:  Negative for choking and chest tightness.    Cardiovascular:  Negative for chest pain and palpitations.   Gastrointestinal:  Negative for abdominal distention, abdominal pain and nausea.   Endocrine: Negative for cold intolerance and heat intolerance.   Genitourinary:  Negative for difficulty urinating and dysuria.   Musculoskeletal:  Negative for myalgias.   Neurological:  Negative for seizures and numbness.     Patient Active Problem List   Diagnosis   • Chronic pain of right knee   • Mild persistent reactive airway disease without complication ([facility])   • Dizzinesses   • ROSALINDA (generalized anxiety disorder)       Past Medical History:   Diagnosis Date   • Acute maxillary sinusitis, unspecified 08/30/2016    Acute maxillary sinusitis   • Acute pharyngitis, unspecified 11/02/2015    Sore throat   • Cyst of pancreas ([facility]) 05/28/2015    Cyst and pseudocyst of pancreas   • Effusion, unspecified ankle 08/24/2015    Ankle edema   • Localized swelling, mass and lump, left upper limb 06/05/2015    Lump of skin of left upper extremity   • Otitis media, unspecified, left ear 11/02/2015    Acute left otitis media   • Pain in left foot 08/24/2015    Left foot pain   • Pain in right ankle and joints of right foot 08/30/2016    Right ankle pain   • Pain in right ankle and joints of right foot 08/30/2016    Right ankle pain   • Pain in right knee 03/28/2016    Chronic pain of right knee   • Personal history of diseases of the skin and subcutaneous tissue 08/24/2015    History of folliculitis   • Personal history of other diseases of the digestive system     History of acute pancreatitis   • Personal history of other diseases of the nervous system and sense organs 02/29/2016    History of acute conjunctivitis   • Personal history of other diseases of the respiratory  system 02/13/2017    History of reactive airway disease   • Personal history of other diseases of the respiratory system     Personal history of asthma   • Personal history of other diseases of the respiratory system 05/21/2015    History of allergic rhinitis   • Personal history of other specified conditions 04/04/2016    History of dysuria   • Personal history of other specified conditions 02/29/2016    History of wheezing   • Personal history of other specified conditions 04/26/2016    History of urinary urgency   • Personal history of peptic ulcer disease     Personal history of gastric ulcer   • Rash and other nonspecific skin eruption 08/17/2015    Localized rash   • Shortness of breath 08/17/2015    SOB (shortness of breath) on exertion   • Stress incontinence (female) (male) 04/26/2016    Stress incontinence in female   • Unspecified abdominal pain 06/22/2016    Abdominal discomfort   • Unspecified asthma with (acute) exacerbation ([facility]) 09/13/2016    Acute asthma exacerbation   • Uterovaginal prolapse, unspecified 10/30/2015    Cystocele with uterine prolapse     Past Surgical History:   Procedure Laterality Date   • BREAST BIOPSY Left     benign   • HYSTERECTOMY  11/02/2015    Hysterectomy   • OTHER SURGICAL HISTORY  08/25/2014    Upr GI Endosc W/ Transmural Drainage Of Pseudocyst   • TUBAL LIGATION  06/17/2014    Tubal Ligation     Social History     Tobacco Use   • Smoking status: Every Day     Current packs/day: 0.50     Average packs/day: 0.5 packs/day for 40.0 years (20.0 ttl pk-yrs)     Types: Cigarettes     Start date: 1985   • Smokeless tobacco: Never   Substance Use Topics   • Alcohol use: Not Currently     family history includes Breast cancer in her maternal grandmother.    No Known Allergies    Current Outpatient Medications:   •  albuterol (Ventolin HFA) 90 mcg/actuation inhaler, Inhale 2 puffs every 4 hours if needed for wheezing or shortness of breath., Disp: 8 g, Rfl: 0  •  DULoxetine  (Cymbalta) 30 mg DR capsule, Take 1 capsule (30 mg) by mouth 2 times a day. Do not crush or chew., Disp: 60 capsule, Rfl: 2  •  furosemide (Lasix) 20 mg tablet, Take 1 tablet (20 mg) by mouth once daily., Disp: 30 tablet, Rfl: 2  •  meloxicam (Mobic) 15 mg tablet, Take 1 tablet (15 mg) by mouth once daily., Disp: 30 tablet, Rfl: 2    Objective    /83   Pulse 88   Resp 18   Wt 133 kg (294 lb 3.2 oz)   BMI 42.21 kg/m²     Orthostatic VS negative 1/14/25   [place] negative on 1/14/25 but keeps head still on sitting up    Awake, alert, oriented x3, in no distress  Well-nourished, ambulatory independently  No leg edema    Mental status exam as above, conversant   Fair fund of knowledge  Recent/remote memory fair  Fair attention span  Pupils round reactive to light, 3-4 mm, (-) RAPD   Fundoscopic examination was attempted but fundus was not visualized bilaterally   Full EOMs intact, no nystagmus, no ptosis   V1 to V3 sensation is intact   No facial droop   Hearing grossly intact   No dysarthria  Good shoulder shrug bilaterally   Tongue is midline     Motor strength is 5/5 on all extremities, tone/bulk normal   Reflexes 1+ on all 4 extremities, downgoing toes bilaterally   Sensation is intact to light touch, vibration on all 4 extremities except dec vib on R big toe  Finger to nose test intact bilaterally   Negative Romberg sign   Normal gait       Lab Results   Component Value Date    WBC 5.5 09/24/2024    RBC 4.21 09/24/2024    HGB 14.3 09/24/2024    HCT 41.8 09/24/2024     09/24/2024     09/24/2024    K 4.5 09/24/2024     09/24/2024    BUN 11 09/24/2024    CREATININE 0.73 09/24/2024    EGFR >90 09/24/2024    CALCIUM 9.1 09/24/2024    ALKPHOS 64 09/24/2024    AST 15 09/24/2024    ALT 17 09/24/2024    MG 1.78 12/22/2019    VITD25 12 (A) 12/27/2019    CHOL 231 (H) 12/27/2019    HDL 41.6 12/27/2019    TRIG 123 12/27/2019    TSH 1.82 09/24/2024        Assessment/Plan    Dizziness  Ddx:  vestibular migraine vs vestibular neuritis vs other  NOT lightheaded, NOT ataxia  Discussed    Vestibular migraine  Possible  No brain imaging  Is to start duloxetine--may help  May also consider try sertraline    Plans:  Do MRI brain wo  Check B12 level--will try to add on to existing blood drawn this am  Try duloxetine  Suggested vestibular therapy    Rtc after testing    All questions were answered.  Pt knows how to contact my office in case pt has any questions or concerns.    Celestino Knott MD

## 2025-01-14 NOTE — PROGRESS NOTES
"In-clinic visit    Visit type: provider referral: Dr Marcelo    PCP: Gunjan Marcelo MD PhD.    Subjective     Sydney Juares is a 54 y.o. year old right handed female who presents with Dizziness.    Patient is accompanied by none.     HPI    Noticed dizziness sometime in summer 2024. 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 No LOC. Felt like will pass out. Dizziness kept going on. Was feeling will fall backwards.    Not lightheaded. Can't focus, have a real bad HA. Close eyes, will help. \"Dizzy medicine helps\". No spinning. Sometimes things in vision \"start to move\".     Thinks has gotten worse over time. Was out of work due to symptoms x2-3 weeks. Subsided. Then came back. Not daily now. Lasts 45 seconds to 2-3 minutes. Always with HA. Feels like eyes hurt and forehead hurts and temples. Pressure. (-) sound/light sensitivity. (+) nausea. Has ? Visual aura vs floaters. Never had migraines. Used to have HA a lot with menses.    Has not had eyes checked. Due for check-up--gets checked q2 years. No brain scan.    No new meds. Just prescribed Lasix and duloxetine by PCP yesterday--hasn't picked up yet.    No problems with hearing. No ringing. Had hx referred pain to R ear from dental work.    (+) asthma (?). No kidney stones. No heart problems. No glaucoma.    Smoker, 1ppd. No alcohol. No street drug use      Review of Systems   Constitutional:  Negative for appetite change, chills and fever.   HENT:  Negative for ear pain and nosebleeds.    Eyes:  Negative for discharge and itching.   Respiratory:  Negative for choking and chest tightness.    Cardiovascular:  Negative for chest pain and palpitations.   Gastrointestinal:  Negative for abdominal distention, abdominal pain and nausea.   Endocrine: Negative for cold intolerance and heat intolerance.   Genitourinary:  Negative for difficulty urinating and dysuria.   Musculoskeletal:  Negative for myalgias.   Neurological:  Negative for seizures and " numbness.     Patient Active Problem List   Diagnosis    Chronic pain of right knee    Mild persistent reactive airway disease without complication ([facility])    Dizzinesses    ROSALINDA (generalized anxiety disorder)       Past Medical History:   Diagnosis Date    Acute maxillary sinusitis, unspecified 08/30/2016    Acute maxillary sinusitis    Acute pharyngitis, unspecified 11/02/2015    Sore throat    Cyst of pancreas ([facility]) 05/28/2015    Cyst and pseudocyst of pancreas    Effusion, unspecified ankle 08/24/2015    Ankle edema    Localized swelling, mass and lump, left upper limb 06/05/2015    Lump of skin of left upper extremity    Otitis media, unspecified, left ear 11/02/2015    Acute left otitis media    Pain in left foot 08/24/2015    Left foot pain    Pain in right ankle and joints of right foot 08/30/2016    Right ankle pain    Pain in right ankle and joints of right foot 08/30/2016    Right ankle pain    Pain in right knee 03/28/2016    Chronic pain of right knee    Personal history of diseases of the skin and subcutaneous tissue 08/24/2015    History of folliculitis    Personal history of other diseases of the digestive system     History of acute pancreatitis    Personal history of other diseases of the nervous system and sense organs 02/29/2016    History of acute conjunctivitis    Personal history of other diseases of the respiratory system 02/13/2017    History of reactive airway disease    Personal history of other diseases of the respiratory system     Personal history of asthma    Personal history of other diseases of the respiratory system 05/21/2015    History of allergic rhinitis    Personal history of other specified conditions 04/04/2016    History of dysuria    Personal history of other specified conditions 02/29/2016    History of wheezing    Personal history of other specified conditions 04/26/2016    History of urinary urgency    Personal history of peptic ulcer disease     Personal history of  gastric ulcer    Rash and other nonspecific skin eruption 08/17/2015    Localized rash    Shortness of breath 08/17/2015    SOB (shortness of breath) on exertion    Stress incontinence (female) (male) 04/26/2016    Stress incontinence in female    Unspecified abdominal pain 06/22/2016    Abdominal discomfort    Unspecified asthma with (acute) exacerbation ([facility]) 09/13/2016    Acute asthma exacerbation    Uterovaginal prolapse, unspecified 10/30/2015    Cystocele with uterine prolapse     Past Surgical History:   Procedure Laterality Date    BREAST BIOPSY Left     benign    HYSTERECTOMY  11/02/2015    Hysterectomy    OTHER SURGICAL HISTORY  08/25/2014    Upr GI Endosc W/ Transmural Drainage Of Pseudocyst    TUBAL LIGATION  06/17/2014    Tubal Ligation     Social History     Tobacco Use    Smoking status: Every Day     Current packs/day: 0.50     Average packs/day: 0.5 packs/day for 40.0 years (20.0 ttl pk-yrs)     Types: Cigarettes     Start date: 1985    Smokeless tobacco: Never   Substance Use Topics    Alcohol use: Not Currently     family history includes Breast cancer in her maternal grandmother.    No Known Allergies    Current Outpatient Medications:     albuterol (Ventolin HFA) 90 mcg/actuation inhaler, Inhale 2 puffs every 4 hours if needed for wheezing or shortness of breath., Disp: 8 g, Rfl: 0    DULoxetine (Cymbalta) 30 mg DR capsule, Take 1 capsule (30 mg) by mouth 2 times a day. Do not crush or chew., Disp: 60 capsule, Rfl: 2    furosemide (Lasix) 20 mg tablet, Take 1 tablet (20 mg) by mouth once daily., Disp: 30 tablet, Rfl: 2    meloxicam (Mobic) 15 mg tablet, Take 1 tablet (15 mg) by mouth once daily., Disp: 30 tablet, Rfl: 2    Objective     /83   Pulse 88   Resp 18   Wt 133 kg (294 lb 3.2 oz)   BMI 42.21 kg/m²     Orthostatic VS negative 1/14/25   [place] pike negative on 1/14/25 but keeps head still on sitting up    Awake, alert, oriented x3, in no distress  Well-nourished, ambulatory  independently  No leg edema    Mental status exam as above, conversant   Fair fund of knowledge  Recent/remote memory fair  Fair attention span  Pupils round reactive to light, 3-4 mm, (-) RAPD   Fundoscopic examination was attempted but fundus was not visualized bilaterally   Full EOMs intact, no nystagmus, no ptosis   V1 to V3 sensation is intact   No facial droop   Hearing grossly intact   No dysarthria  Good shoulder shrug bilaterally   Tongue is midline     Motor strength is 5/5 on all extremities, tone/bulk normal   Reflexes 1+ on all 4 extremities, downgoing toes bilaterally   Sensation is intact to light touch, vibration on all 4 extremities except dec vib on R big toe  Finger to nose test intact bilaterally   Negative Romberg sign   Normal gait       Lab Results   Component Value Date    WBC 5.5 09/24/2024    RBC 4.21 09/24/2024    HGB 14.3 09/24/2024    HCT 41.8 09/24/2024     09/24/2024     09/24/2024    K 4.5 09/24/2024     09/24/2024    BUN 11 09/24/2024    CREATININE 0.73 09/24/2024    EGFR >90 09/24/2024    CALCIUM 9.1 09/24/2024    ALKPHOS 64 09/24/2024    AST 15 09/24/2024    ALT 17 09/24/2024    MG 1.78 12/22/2019    VITD25 12 (A) 12/27/2019    CHOL 231 (H) 12/27/2019    HDL 41.6 12/27/2019    TRIG 123 12/27/2019    TSH 1.82 09/24/2024        Assessment/Plan     Dizziness  Ddx: vestibular migraine vs vestibular neuritis vs other  NOT lightheaded, NOT ataxia  Discussed    Vestibular migraine  Possible  No brain imaging  Is to start duloxetine--may help  May also consider try sertraline    Plans:  Do MRI brain wo  Check B12 level--will try to add on to existing blood drawn this am  Try duloxetine  Suggested vestibular therapy    Rtc after testing    All questions were answered.  Pt knows how to contact my office in case pt has any questions or concerns.    Celestino Knott MD

## 2025-01-28 ENCOUNTER — CLINICAL SUPPORT (OUTPATIENT)
Dept: PHYSICAL THERAPY | Facility: HOSPITAL | Age: 55
End: 2025-01-28
Payer: COMMERCIAL

## 2025-01-28 DIAGNOSIS — R26.81 UNSTEADINESS ON FEET: Primary | ICD-10-CM

## 2025-01-28 DIAGNOSIS — R42 DIZZINESSES: ICD-10-CM

## 2025-01-28 PROCEDURE — 97161 PT EVAL LOW COMPLEX 20 MIN: CPT | Mod: GP | Performed by: PHYSICAL THERAPIST

## 2025-01-28 PROCEDURE — 97112 NEUROMUSCULAR REEDUCATION: CPT | Mod: GP | Performed by: PHYSICAL THERAPIST

## 2025-01-28 ASSESSMENT — PAIN DESCRIPTION - DESCRIPTORS: DESCRIPTORS: ACHING

## 2025-01-28 ASSESSMENT — PAIN - FUNCTIONAL ASSESSMENT: PAIN_FUNCTIONAL_ASSESSMENT: 0-10

## 2025-01-28 ASSESSMENT — PATIENT HEALTH QUESTIONNAIRE - PHQ9
2. FEELING DOWN, DEPRESSED OR HOPELESS: NOT AT ALL
1. LITTLE INTEREST OR PLEASURE IN DOING THINGS: NOT AT ALL
SUM OF ALL RESPONSES TO PHQ9 QUESTIONS 1 AND 2: 0

## 2025-01-28 ASSESSMENT — ENCOUNTER SYMPTOMS
DEPRESSION: 0
OCCASIONAL FEELINGS OF UNSTEADINESS: 0
LOSS OF SENSATION IN FEET: 0

## 2025-01-28 ASSESSMENT — PAIN SCALES - GENERAL: PAINLEVEL_OUTOF10: 6

## 2025-01-28 NOTE — PROGRESS NOTES
"Physical Therapy    Physical Therapy Evaluation and Treatment      Patient Name: Sydney Juares  MRN: [id number]  : 1970   Today's Date: 2025  Time Calculation  Start Time: 1000  Stop Time: 1050  Time Calculation (min): 50 min     PT Evaluation Time Entry  PT Evaluation (Low) Time Entry: 40  PT Therapeutic Procedures Time Entry  Neuromuscular Re-Education Time Entry: 10      Visit # 1    Assessment: Pt shows some s/s of possible right vestibular hypofunction affecting her balance and function. Pt would benefit from skilled PT to address these limitations for improved function and quality of life.    PT Assessment Results: Impaired balance, Decreased mobility, Pain (Dizziness)  Rehab Prognosis: Good    Plan:  Treatment/Interventions: Education/ Instruction, Manual therapy, Neuromuscular re-education, Therapeutic exercises  PT Plan: Skilled PT  PT Frequency: 1 time per week  Duration: 6-8 wks  Certification Period Start Date: 25  Certification Period End Date: 26  Number of Treatments Authorized: 20v  Rehab Potential: Good  Plan of Care Agreement: Patient    Current Problem:   1. Unsteadiness on feet  Follow Up In Physical Therapy      2. Dizzinesses  Referral to Physical Therapy    Follow Up In Physical Therapy          Subjective      Chief complaint: Pt states in May of 2024 she fell, hitting her knees and chin. Started to feel dizzy immediately. After that would have periodically have dizzy spells, 1-2x/wk, feeling vision distorted, maybe room spinning a bit but feels it's mainly a vision problem. Better lately but can still have problems with turning head too quickly. Currently gets pressure in head/HA's but doesn't progress to dizziness like it used to. Cymbalta may be helping. HA's predated her dizziness. No room spinning with bed mobility but reports she gets \"chills\" that go down her body when she lays down. Denies ear pain or tinnitus typically. Intermittent neck pain. To have eyes " checked soon. Sometimes loses balance when she first gets up.    Pain Better: rest, heat, meds    Pain Worse: unsure    Imaging: to have an MRI    Prior level of function: independent     Current limitations: sometimes unable to work when flared up    Work:     Patient's goal: get rid of dizziness    Precautions:  Precautions  STEADI Fall Risk Score (The score of 4 or more indicates an increased risk of falling): 4  Precautions Comment: None    Pain:  Pain Assessment  Pain Assessment: 0-10  0-10 (Numeric) Pain Score: 6  Pain Location: Head  Pain Descriptors: Aching  Pain Frequency: Intermittent    Objective:  Objective   Cervical AROM: WNL    TTP suboccipitally adilia, cervical paraspinals adilia, and UT's adilia    Smooth pursuits: WNL  Saccades: WNL  Convergence: WNL    Head shake (horizontal): WNL  Head shake (vertical): with some brief nystagmus (directionality unclear) and c/o blurriness and dizziness lasting about 30 seconds    Head thrust test: slight (+) R, (-) L  VOR cancel: WNL    Resting nystagmus (blocked fixation): WNL  Gaze-evoked nystagmus (blocked fixation): WNL    Feet apart, eyes open: WNL  Feet apart, eyes closed: SWAY  Feet together, eyes open: WNL  Feet together, eyes closed: SWAY and near LOB  Compliant surface, eyes open: WNL  Compliant surface, eyes closed: LOB    Yann-Hallpike: (-) adilia  Roll test: (-) adilia    Outcome Measures:  Other Measures  Dizziness Handicap Inventory: 20     Treatments:  EXERCISES Date  Date  Date Date   VISIT# # # # #    REPS REPS REPS REPS          Nu-Step              Parallel bars:       Walk with head turns, horiz/vert       X1 walk, horiz/vert       Tandem walk       Walk with head following ball motion, horiz/vert                            Foam:       Head turns, horiz/vert       Reaching       Marching                                   Wall falls                            Seated:       2-target VOR       X2 horiz/vert                                           HEP: Access Code: [email or web address]  Exercises  - Seated Gaze Stabilization with Head Rotation  - 2 x daily - 2 sets  - Seated Gaze Stabilization with Head Nod  - 2 x daily - 2 sets          Goals:  Active       Dizziness       Pt will hold tandem stance, eyes closed for 30 seconds without LOB for walking in the dark.       Start:  01/28/25    Expected End:  04/28/25            Improve DHI score >=16 points to decrease risk of falling.         Start:  01/28/25    Expected End:  04/28/25            Independent HEP for continued gains after PT is complete.        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        Start:  01/28/25    Expected End:  04/28/25

## 2025-02-06 ENCOUNTER — APPOINTMENT (OUTPATIENT)
Dept: PHYSICAL THERAPY | Facility: HOSPITAL | Age: 55
End: 2025-02-06
Payer: COMMERCIAL

## 2025-02-06 ENCOUNTER — DOCUMENTATION (OUTPATIENT)
Dept: PHYSICAL THERAPY | Facility: HOSPITAL | Age: 55
End: 2025-02-06
Payer: COMMERCIAL

## 2025-02-06 NOTE — PROGRESS NOTES
Physical Therapy                 Therapy Communication Note    Patient Name: Sydney Juares  MRN: [id number]  Department:   Room: Room/bed info not found  Today's Date: 2/6/2025     Discipline: Physical Therapy          Missed Visit Reason:      Missed Time: Cancel    Comment: weather

## 2025-02-13 ENCOUNTER — DOCUMENTATION (OUTPATIENT)
Dept: PHYSICAL THERAPY | Facility: HOSPITAL | Age: 55
End: 2025-02-13
Payer: COMMERCIAL

## 2025-02-13 ENCOUNTER — APPOINTMENT (OUTPATIENT)
Dept: PHYSICAL THERAPY | Facility: HOSPITAL | Age: 55
End: 2025-02-13
Payer: COMMERCIAL

## 2025-02-13 NOTE — PROGRESS NOTES
Physical Therapy                 Therapy Communication Note    Patient Name: Sydney Juares  MRN: [id number]  : 1970   Today's Date: 2025     Discipline: Physical Therapy    Missed Time: Cancel    Comment: Not feeling well.

## 2025-02-14 ENCOUNTER — HOSPITAL ENCOUNTER (OUTPATIENT)
Dept: RADIOLOGY | Facility: HOSPITAL | Age: 55
Discharge: HOME | End: 2025-02-14
Payer: COMMERCIAL

## 2025-02-14 DIAGNOSIS — R42 DIZZINESSES: ICD-10-CM

## 2025-02-14 PROCEDURE — 70553 MRI BRAIN STEM W/O & W/DYE: CPT

## 2025-02-14 PROCEDURE — A9575 INJ GADOTERATE MEGLUMI 0.1ML: HCPCS | Performed by: PSYCHIATRY & NEUROLOGY

## 2025-02-14 PROCEDURE — 2550000001 HC RX 255 CONTRASTS: Performed by: PSYCHIATRY & NEUROLOGY

## 2025-02-14 RX ORDER — GADOTERATE MEGLUMINE 376.9 MG/ML
0.2 INJECTION INTRAVENOUS
Status: COMPLETED | OUTPATIENT
Start: 2025-02-14 | End: 2025-02-14

## 2025-02-14 RX ADMIN — GADOTERATE MEGLUMINE 27 ML: 376.9 INJECTION INTRAVENOUS at 07:22

## 2025-02-15 DIAGNOSIS — B35.1 ONYCHOMYCOSIS: Primary | ICD-10-CM

## 2025-02-15 RX ORDER — TERBINAFINE HYDROCHLORIDE 250 MG/1
250 TABLET ORAL DAILY
Qty: 84 TABLET | Refills: 0 | Status: SHIPPED | OUTPATIENT
Start: 2025-02-15

## 2025-02-20 ENCOUNTER — APPOINTMENT (OUTPATIENT)
Dept: PHYSICAL THERAPY | Facility: HOSPITAL | Age: 55
End: 2025-02-20
Payer: COMMERCIAL

## 2025-02-20 ENCOUNTER — DOCUMENTATION (OUTPATIENT)
Dept: PHYSICAL THERAPY | Facility: HOSPITAL | Age: 55
End: 2025-02-20
Payer: COMMERCIAL

## 2025-02-25 ENCOUNTER — APPOINTMENT (OUTPATIENT)
Dept: PHYSICAL THERAPY | Facility: HOSPITAL | Age: 55
End: 2025-02-25
Payer: COMMERCIAL

## 2025-03-03 ENCOUNTER — OFFICE VISIT (OUTPATIENT)
Dept: NEUROLOGY | Facility: HOSPITAL | Age: 55
End: 2025-03-03
Payer: COMMERCIAL

## 2025-03-03 VITALS
DIASTOLIC BLOOD PRESSURE: 83 MMHG | WEIGHT: 291 LBS | HEART RATE: 73 BPM | BODY MASS INDEX: 41.75 KG/M2 | SYSTOLIC BLOOD PRESSURE: 138 MMHG

## 2025-03-03 DIAGNOSIS — F17.200 TOBACCO USE DISORDER: ICD-10-CM

## 2025-03-03 DIAGNOSIS — E53.8 B12 DEFICIENCY: ICD-10-CM

## 2025-03-03 DIAGNOSIS — G43.109 MIGRAINE WITH AURA AND WITHOUT STATUS MIGRAINOSUS, NOT INTRACTABLE: Primary | ICD-10-CM

## 2025-03-03 DIAGNOSIS — R42 DIZZINESS: ICD-10-CM

## 2025-03-03 DIAGNOSIS — R90.89 ABNORMAL BRAIN MRI: ICD-10-CM

## 2025-03-03 PROCEDURE — 99214 OFFICE O/P EST MOD 30 MIN: CPT | Performed by: PSYCHIATRY & NEUROLOGY

## 2025-03-03 RX ORDER — TOPIRAMATE 25 MG/1
TABLET ORAL
Qty: 60 TABLET | Refills: 2 | Status: SHIPPED | OUTPATIENT
Start: 2025-03-03

## 2025-03-03 ASSESSMENT — ENCOUNTER SYMPTOMS
LOSS OF SENSATION IN FEET: 0
OCCASIONAL FEELINGS OF UNSTEADINESS: 0
DEPRESSION: 0

## 2025-03-03 ASSESSMENT — PAIN SCALES - GENERAL: PAINLEVEL_OUTOF10: 2

## 2025-03-03 NOTE — PROGRESS NOTES
"Follow-up visit    Visit type: Follow-up    PCP: Gunjan Marcelo MD PhD.    Subjective     Sydney Juares is a 54 y.o. year old female here for follow-up. Last seen 1/14/2025.     Patient is accompanied by none.       HPI    I first saw her 1/14/25 for dizziness. Noticed dizziness sometime in summer 2024. 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 No LOC. Felt like will pass out. Dizziness kept going on. Was feeling will fall backwards. Not lightheaded. Can't focus, have a real bad HA. Close eyes, will help. \"Dizzy medicine helps\". No spinning. Sometimes things in vision \"start to move\". Thinks has gotten worse over time. Was out of work due to symptoms x2-3 weeks. Subsided. Then came back. Not daily now. Lasts 45 seconds to 2-3 minutes. Always with HA. Feels like eyes hurt and forehead hurts and temples. Pressure. (-) sound/light sensitivity. (+) nausea. Has ? Visual aura vs floaters. Never had migraines. Used to have HA a lot with menses. Has not had eyes checked. Due for check-up--gets checked q2 years. No brain scan. No new meds. Just prescribed Lasix and duloxetine by PCP yesterday--hasn't picked up yet. No problems with hearing. No ringing. Had hx referred pain to R ear from dental work. (+) asthma (?). No kidney stones. No heart problems. No glaucoma. Smoker, 1ppd. No alcohol. No street drug use. During last visit, discussed poss vestibular migraine, to do MRI IAC wwo, check B12, try duloxetine prescribed by PCP, and vestibular therapy.    Since last visit, B12 borderline. I recommended starting B12 supplement and I let her know via portal messaging. MRI IAC wwo done was unremarkable, except non-specific subcortical WM disease. She did get evaluated by vestibular therapist, but cancelled followup appointments and was discharged.    Here today for followup.    Dizziness not as often. Not as bothered as before.    States she has episodes of severe HA. Light bothers her. Throbbing. " Bifrontal, sometimes temples. With visual aura. At least 3 days a week. Hx migraine when younger but not diagnosed. Never been on medication in past for this.    Took B12 supplement a few days but stopped--forgets to take it.    Has eye appointment in 3 days.    Started duloxetine for anxiety.    Still smoking    S/p hysterectomy    B12 borderline      Patient Active Problem List   Diagnosis    Chronic pain of right knee    Mild persistent reactive airway disease without complication ([facility])    Dizzinesses    ROSALINDA (generalized anxiety disorder)    Unsteadiness on feet       No Known Allergies    Current Outpatient Medications:     albuterol (Ventolin HFA) 90 mcg/actuation inhaler, Inhale 2 puffs every 4 hours if needed for wheezing or shortness of breath., Disp: 8 g, Rfl: 0    DULoxetine (Cymbalta) 30 mg DR capsule, Take 1 capsule (30 mg) by mouth 2 times a day. Do not crush or chew., Disp: 60 capsule, Rfl: 2    furosemide (Lasix) 20 mg tablet, Take 1 tablet (20 mg) by mouth once daily., Disp: 30 tablet, Rfl: 2    meloxicam (Mobic) 15 mg tablet, Take 1 tablet (15 mg) by mouth once daily., Disp: 30 tablet, Rfl: 2    terbinafine (LamISIL) 250 mg tablet, Take 1 tablet (250 mg) by mouth once daily., Disp: 84 tablet, Rfl: 0     Objective     /83   Pulse 73   Wt 132 kg (291 lb)   BMI 41.75 kg/m²        Orthostatic VS negative 1/14/25   [place] pike negative on 1/14/25 but keeps head still on sitting up     Awake, alert, oriented x3, in no distress  Well-nourished, ambulatory independently     Mental status exam as above, conversant   Full EOMs intact, no nystagmus, no ptosis   No facial droop   Hearing grossly intact   No dysarthria     Motor strength is at least antigravity on all extremities  Normal gait     Lab Results   Component Value Date    WBC 5.5 09/24/2024    RBC 4.21 09/24/2024    HGB 14.3 09/24/2024    HCT 41.8 09/24/2024     09/24/2024     01/14/2025    K 4.6 01/14/2025      01/14/2025    BUN 13 01/14/2025    CREATININE 0.75 01/14/2025    EGFR >90 01/14/2025    CALCIUM 9.5 01/14/2025    ALKPHOS 60 01/14/2025    AST 14 01/14/2025    ALT 15 01/14/2025    MG 1.78 12/22/2019    QAHMNEML33 289 01/14/2025    VITD25 12 (A) 12/27/2019    CHOL 231 (H) 12/27/2019    HDL 41.6 12/27/2019    TRIG 123 12/27/2019    TSH 1.82 09/24/2024        Assessment/Plan     Migraine with aura  Ddx vestibular migraine  MRI IAC wwo normal, except non-specific WM disease  Not previously diagnosed, not previously medicated for  On duloxetine by PCP for anxiety  Discussed poss trial of topiramate, poss SE discussed--she is willing to try    Dizziness  B12 deficiency  Ddx: vestibular migraine vs vestibular neuritis vs other  NOT lightheaded, NOT ataxia  Had vestibular therapy evaluation but did not follow-up  Took B12 supplement for only a few days then stopped     4. Abnormal brain MRI  Reviewed images, discussed ddx of WM spots    5. Tobacco use disorder  Advised again quit       Plans:  Start B12 1000 microgram oral daily  Cut down/quit smoking  Try topiramate 25mg at night x1 week, then 25mg 2x/day--erx'd  Up to you and PCP if continuing duloxetine or not    Let me know in about 1 mo how you're doing    Rtc 2-3 mo     All questions were answered.  Pt knows how to contact my office in case pt has any questions or concerns.    Celestino Knott MD

## 2025-03-03 NOTE — PATIENT INSTRUCTIONS
Start B12 1000 microgram oral daily  Cut down/quit smoking  Try topiramate 25mg at night x1 week, then 25mg 2x/day--erx'd  Up to you and PCP if continuing duloxetine or not    Let me know in about 1 mo how you're doing    Rtc 2-3 mo

## 2025-05-09 ENCOUNTER — OFFICE VISIT (OUTPATIENT)
Dept: PRIMARY CARE | Facility: CLINIC | Age: 55
End: 2025-05-09
Payer: COMMERCIAL

## 2025-05-09 VITALS — RESPIRATION RATE: 14 BRPM | SYSTOLIC BLOOD PRESSURE: 128 MMHG | DIASTOLIC BLOOD PRESSURE: 71 MMHG | HEART RATE: 68 BPM

## 2025-05-09 DIAGNOSIS — J01.00 ACUTE NON-RECURRENT MAXILLARY SINUSITIS: Primary | ICD-10-CM

## 2025-05-09 DIAGNOSIS — Z12.11 COLON CANCER SCREENING: ICD-10-CM

## 2025-05-09 DIAGNOSIS — F41.1 GAD (GENERALIZED ANXIETY DISORDER): ICD-10-CM

## 2025-05-09 DIAGNOSIS — M25.561 CHRONIC PAIN OF RIGHT KNEE: ICD-10-CM

## 2025-05-09 DIAGNOSIS — G89.29 CHRONIC PAIN OF RIGHT KNEE: ICD-10-CM

## 2025-05-09 PROBLEM — J45.30 MILD PERSISTENT REACTIVE AIRWAY DISEASE WITHOUT COMPLICATION (HHS-HCC): Status: RESOLVED | Noted: 2024-01-24 | Resolved: 2025-05-09

## 2025-05-09 LAB — HEMOCCULT STL QL IA: NORMAL

## 2025-05-09 PROCEDURE — 99214 OFFICE O/P EST MOD 30 MIN: CPT | Performed by: FAMILY MEDICINE

## 2025-05-09 RX ORDER — MELOXICAM 15 MG/1
15 TABLET ORAL DAILY
Qty: 30 TABLET | Refills: 2 | Status: SHIPPED | OUTPATIENT
Start: 2025-05-09

## 2025-05-09 RX ORDER — AMOXICILLIN 875 MG/1
875 TABLET, FILM COATED ORAL 2 TIMES DAILY
Qty: 20 TABLET | Refills: 0 | Status: SHIPPED | OUTPATIENT
Start: 2025-05-09 | End: 2025-05-19

## 2025-05-09 RX ORDER — ESCITALOPRAM OXALATE 10 MG/1
10 TABLET ORAL DAILY
Qty: 30 TABLET | Refills: 0 | Status: SHIPPED | OUTPATIENT
Start: 2025-05-09 | End: 2025-11-05

## 2025-05-09 NOTE — ASSESSMENT & PLAN NOTE
Persistent. Cold compress and mobic prn.   Orders:    meloxicam (Mobic) 15 mg tablet; Take 1 tablet (15 mg) by mouth once daily.

## 2025-05-09 NOTE — ASSESSMENT & PLAN NOTE
Persistent, not tolerating Cymbalta. Start lexapro. Fu in 1 mos  Orders:    escitalopram (Lexapro) 10 mg tablet; Take 1 tablet (10 mg) by mouth once daily.

## 2025-05-09 NOTE — PROGRESS NOTES
Subjective   Patient ID: Sydney Juares is a 54 y.o. female who presents for sinus for 1 week    HPI   Patient started to have sinus congestion, facial ache, yellowish nasal discharge and mild dull headache 3 days ago. Patient also had cough with yellow phlegm. Normal hearing. No body aches, loss of smell/tastes,  fever, chills. Patient denies having shortness of breath, chest pain, heart palpitation. No  nausea, vomiting or abdominal pain. Patient had normal appetite. No stiff neck. Symptoms persisted today. Rt knee dull pain persisted at 4-7/10. Walking made the pain worse. Mobic helped pain relief well. Cymablta caused HA. Pt cont to have anxiety most of the  time. No depression       Review of Systems    Objective   /71   Pulse 68   Resp 14     Physical Exam  No  distress. Eyes: EMOI, No conjunctival erythema. Left  maxillary  area tenderness upon palpation. There was yellowish nasal discharge. Lungs: CTA b/l, Heart: RRR. Abdomen: soft, no tenderness. Bowel sound: normal. + rt knee tenderness, Patient walks with a good balance. Looks anxious. No depressed mood     Assessment/Plan   Assessment & Plan  Colon cancer screening    Orders:    Cologuard® colon cancer screening; Future    ROSALINDA (generalized anxiety disorder)  Persistent, not tolerating Cymbalta. Start lexapro. Fu in 1 mos  Orders:    escitalopram (Lexapro) 10 mg tablet; Take 1 tablet (10 mg) by mouth once daily.    Chronic pain of right knee  Persistent. Cold compress and mobic prn.   Orders:    meloxicam (Mobic) 15 mg tablet; Take 1 tablet (15 mg) by mouth once daily.    Acute non-recurrent maxillary sinusitis  Acute sinusitis: Antibiotics as above. Saline nasal spray. Tylenol prn for fever or ache. Increase fluid intake. If symptoms do not improve in 3-4  days, call office.    Orders:    amoxicillin (Amoxil) 875 mg tablet; Take 1 tablet (875 mg) by mouth 2 times a day for 10 days.

## 2025-05-19 ENCOUNTER — OFFICE VISIT (OUTPATIENT)
Dept: NEUROLOGY | Facility: HOSPITAL | Age: 55
End: 2025-05-19
Payer: COMMERCIAL

## 2025-05-19 VITALS
SYSTOLIC BLOOD PRESSURE: 139 MMHG | WEIGHT: 289.2 LBS | BODY MASS INDEX: 41.5 KG/M2 | HEART RATE: 73 BPM | DIASTOLIC BLOOD PRESSURE: 82 MMHG

## 2025-05-19 DIAGNOSIS — G43.109 MIGRAINE WITH AURA AND WITHOUT STATUS MIGRAINOSUS, NOT INTRACTABLE: ICD-10-CM

## 2025-05-19 DIAGNOSIS — R42 DIZZINESS: ICD-10-CM

## 2025-05-19 PROCEDURE — 99214 OFFICE O/P EST MOD 30 MIN: CPT | Performed by: PSYCHIATRY & NEUROLOGY

## 2025-05-19 RX ORDER — TOPIRAMATE 50 MG/1
50 TABLET, FILM COATED ORAL DAILY
Qty: 90 TABLET | Refills: 1 | Status: SHIPPED | OUTPATIENT
Start: 2025-05-19

## 2025-05-19 ASSESSMENT — PAIN SCALES - GENERAL: PAINLEVEL_OUTOF10: 5

## 2025-05-19 ASSESSMENT — ENCOUNTER SYMPTOMS
DEPRESSION: 0
LOSS OF SENSATION IN FEET: 0
OCCASIONAL FEELINGS OF UNSTEADINESS: 0

## 2025-05-19 NOTE — PROGRESS NOTES
"Follow-up visit    Visit type: Follow-up    PCP: Gunjan Marcelo MD PhD.    Subjective     Sydney Juares is a 54 y.o. year old female here for follow-up. Last seen 3/3/2025.     Patient is accompanied by none.       Migraine     I first saw her 1/14/25 for dizziness. Noticed dizziness sometime in summer 2024. 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 No LOC. Felt like will pass out. Dizziness kept going on. Was feeling will fall backwards. Not lightheaded. Can't focus, have a real bad HA. Close eyes, will help. \"Dizzy medicine helps\". No spinning. Sometimes things in vision \"start to move\". Thinks has gotten worse over time. Was out of work due to symptoms x2-3 weeks. Subsided. Then came back. Not daily now. Lasts 45 seconds to 2-3 minutes. Always with HA. Feels like eyes hurt and forehead hurts and temples. Pressure. (-) sound/light sensitivity. (+) nausea. Has ? Visual aura vs floaters. Never had migraines. Used to have HA a lot with menses. Has not had eyes checked. Due for check-up--gets checked q2 years. No brain scan. No new meds. Just prescribed Lasix and duloxetine by PCP yesterday--hasn't picked up yet. No problems with hearing. No ringing. Had hx referred pain to R ear from dental work. (+) asthma (?). No kidney stones. No heart problems. No glaucoma. Smoker, 1ppd. No alcohol. No street drug use. I discussed poss vestibular migraine, to do MRI IAC wwo, check B12, try duloxetine prescribed by PCP, and vestibular therapy. B12 found borderline. I recommended starting B12 supplement and I let her know via portal messaging. MRI IAC wwo done was unremarkable, except non-specific subcortical WM disease. She did get evaluated by vestibular therapist, but cancelled followup appointments and was discharged. Last visit dizziness was somewhat better. I advised cut down/quit smoking, start B12 supplement, and try topiramate up to 25mg bid.    Here today for followup.    Still having dizziness " "on-off. Off duloxetine. Seemed too much when duloxetine was taken with topiramate--was getting sedated. She reports duloxetine was switched to escitalopram. Has only been taking a few days. Appears to be less sedating.    Has HA currently. Feels \"seasonal headache\".    Taking topiramate 50mg daily--she feels this is working well for her.    Currently happy with HA control.    Not wanting/needing other migraine meds.      Patient Active Problem List   Diagnosis    Chronic pain of right knee    Dizziness    ROSALINDA (generalized anxiety disorder)    Unsteadiness on feet    Migraine with aura and without status migrainosus, not intractable    Abnormal brain MRI    Tobacco use disorder    B12 deficiency       No Known Allergies    Current Outpatient Medications:     albuterol (Ventolin HFA) 90 mcg/actuation inhaler, Inhale 2 puffs every 4 hours if needed for wheezing or shortness of breath., Disp: 8 g, Rfl: 0    amoxicillin (Amoxil) 875 mg tablet, Take 1 tablet (875 mg) by mouth 2 times a day for 10 days., Disp: 20 tablet, Rfl: 0    escitalopram (Lexapro) 10 mg tablet, Take 1 tablet (10 mg) by mouth once daily., Disp: 30 tablet, Rfl: 0    furosemide (Lasix) 20 mg tablet, Take 1 tablet (20 mg) by mouth once daily., Disp: 30 tablet, Rfl: 2    meloxicam (Mobic) 15 mg tablet, Take 1 tablet (15 mg) by mouth once daily., Disp: 30 tablet, Rfl: 2    terbinafine (LamISIL) 250 mg tablet, Take 1 tablet (250 mg) by mouth once daily. (Patient taking differently: Take 1 tablet (250 mg) by mouth once daily. prn), Disp: 84 tablet, Rfl: 0    topiramate (Topamax) 25 mg tablet, Take topiramate 25mg at night x1 week, then 25mg 2x/day, Disp: 60 tablet, Rfl: 2     Objective     /82   Pulse 73   Wt 131 kg (289 lb 3.2 oz)   BMI 41.50 kg/m²        Orthostatic VS negative 1/14/25   Dixhall pike negative on 1/14/25 but keeps head still on sitting up     Awake, alert, oriented x3, in no distress  Well-nourished, ambulatory independently   "   Mental status exam as above, conversant   Full EOMs intact, no nystagmus, no ptosis   No facial droop   Hearing grossly intact   No dysarthria     Motor strength is at least antigravity on all extremities  Normal gait     Lab Results   Component Value Date    WBC 5.5 09/24/2024    RBC 4.21 09/24/2024    HGB 14.3 09/24/2024    HCT 41.8 09/24/2024     09/24/2024     01/14/2025    K 4.6 01/14/2025     01/14/2025    BUN 13 01/14/2025    CREATININE 0.75 01/14/2025    EGFR >90 01/14/2025    CALCIUM 9.5 01/14/2025    ALKPHOS 60 01/14/2025    AST 14 01/14/2025    ALT 15 01/14/2025    MG 1.78 12/22/2019    [id number] 289 01/14/2025    VITD25 12 (A) 12/27/2019    CHOL 231 (H) 12/27/2019    HDL 41.6 12/27/2019    TRIG 123 12/27/2019    TSH 1.82 09/24/2024        Assessment/Plan     Migraine with aura  Ddx vestibular migraine  MRI IAC wwo normal, except non-specific WM disease  Not previously diagnosed, not previously medicated for  S/p duloxetine by PCP for anxiety--was sedating with topiramate  On escitalopram by PCP  On topiramate 50mg daily--pt states helping  Discussed poss use of triptan prn. Correct way of taking discussed. Poss SE discussed. Pt is interested in trying.    Dizziness  B12 deficiency  Ddx: vestibular migraine vs vestibular neuritis vs other  NOT lightheaded, NOT ataxia  Had vestibular therapy evaluation but did not follow-up  Took B12 supplement for only a few days then stopped     4. Abnormal brain MRI  Reviewed images, discussed ddx of WM spots  Not discussed today    5. Tobacco use disorder  Prev advised to quit  Not discussed today       Plans:  Continue topiramate 50mg daily--erx'd 50mg tab  Start sumatriptan 100mg take as needed to abort migraine, can take another 100mg 2 hours later if needed--erx'd  3.   Consider recheck B12 level later    Rtc 6 mo     All questions were answered.  Pt knows how to contact my office in case pt has any questions or concerns.    Celestino [facility]IG[facility],  MD

## 2025-05-19 NOTE — PATIENT INSTRUCTIONS
Continue topiramate 50mg daily--erx'd 50mg tab  Start sumatriptan 100mg take as needed to abort migraine, can take another 100mg 2 hours later if needed--erx'd  Consider recheck B12 level later    Rtc 6 mo

## 2025-05-21 ENCOUNTER — TELEPHONE (OUTPATIENT)
Dept: NEUROLOGY | Facility: HOSPITAL | Age: 55
End: 2025-05-21
Payer: COMMERCIAL

## 2025-05-21 DIAGNOSIS — G43.109 MIGRAINE WITH AURA AND WITHOUT STATUS MIGRAINOSUS, NOT INTRACTABLE: Primary | ICD-10-CM

## 2025-05-21 RX ORDER — SUMATRIPTAN SUCCINATE 100 MG/1
100 TABLET ORAL ONCE AS NEEDED
Qty: 9 TABLET | Refills: 5 | Status: SHIPPED | OUTPATIENT
Start: 2025-05-21

## 2025-06-03 ENCOUNTER — OFFICE VISIT (OUTPATIENT)
Dept: PRIMARY CARE | Facility: CLINIC | Age: 55
End: 2025-06-03
Payer: COMMERCIAL

## 2025-06-03 VITALS — SYSTOLIC BLOOD PRESSURE: 132 MMHG | RESPIRATION RATE: 15 BRPM | DIASTOLIC BLOOD PRESSURE: 76 MMHG | HEART RATE: 76 BPM

## 2025-06-03 DIAGNOSIS — F41.1 GAD (GENERALIZED ANXIETY DISORDER): ICD-10-CM

## 2025-06-03 DIAGNOSIS — J45.30 MILD PERSISTENT REACTIVE AIRWAY DISEASE WITHOUT COMPLICATION (HHS-HCC): Primary | ICD-10-CM

## 2025-06-03 PROCEDURE — 99214 OFFICE O/P EST MOD 30 MIN: CPT | Performed by: FAMILY MEDICINE

## 2025-06-03 PROCEDURE — 90715 TDAP VACCINE 7 YRS/> IM: CPT | Performed by: FAMILY MEDICINE

## 2025-06-03 PROCEDURE — 90471 IMMUNIZATION ADMIN: CPT | Performed by: FAMILY MEDICINE

## 2025-06-03 PROCEDURE — 96372 THER/PROPH/DIAG INJ SC/IM: CPT | Performed by: FAMILY MEDICINE

## 2025-06-03 RX ORDER — TRIAMCINOLONE ACETONIDE 40 MG/ML
40 INJECTION, SUSPENSION INTRA-ARTICULAR; INTRAMUSCULAR ONCE
Status: COMPLETED | OUTPATIENT
Start: 2025-06-03 | End: 2025-06-03

## 2025-06-03 RX ORDER — ALBUTEROL SULFATE 90 UG/1
2 INHALANT RESPIRATORY (INHALATION) EVERY 4 HOURS PRN
Qty: 8 G | Refills: 0 | Status: SHIPPED | OUTPATIENT
Start: 2025-06-03 | End: 2026-06-03

## 2025-06-03 RX ORDER — AMOXICILLIN AND CLAVULANATE POTASSIUM 875; 125 MG/1; MG/1
875 TABLET, FILM COATED ORAL 2 TIMES DAILY
Qty: 20 TABLET | Refills: 0 | Status: SHIPPED | OUTPATIENT
Start: 2025-06-03 | End: 2025-06-13

## 2025-06-03 RX ORDER — ESCITALOPRAM OXALATE 10 MG/1
10 TABLET ORAL DAILY
Qty: 90 TABLET | Refills: 0 | Status: SHIPPED | OUTPATIENT
Start: 2025-06-03 | End: 2025-11-30

## 2025-06-03 RX ADMIN — TRIAMCINOLONE ACETONIDE 40 MG: 40 INJECTION, SUSPENSION INTRA-ARTICULAR; INTRAMUSCULAR at 09:17

## 2025-06-03 NOTE — ASSESSMENT & PLAN NOTE
controlled with lexapro. No depression or HI/SI. Cont. the same. f/u in 3 mos    Orders:    escitalopram (Lexapro) 10 mg tablet; Take 1 tablet (10 mg) by mouth once daily.

## 2025-06-03 NOTE — PROGRESS NOTES
Subjective   Patient ID: Sydney Juares is a 54 y.o. female who presents for fu    HPI   Patient started to have sob, cough, yellow phlegm and wheezing 7 days ago. Symptoms were worse at night and in am. No fever or chills. No Cp, heart palpitation, hemoptysis, HA, dizziness, neck stiffness or confusion. Pt has had normal appetite. Symptoms persisted today. Melecio was controlled. No depression or hi/si    Review of Systems    Objective   /76   Pulse 76   Resp 15     Physical Exam  No  distress, well groomed, neck: supple, no cervical lymphadenopathy, lungs: wheezing b/l, no rales, heart: RRR, cap refill was less than 2 secs, no cyanosis, clubbing or LE edema, abd: soft, no tenderness, BS+, Good balance.     Assessment/Plan   Assessment & Plan  MELECIO (generalized anxiety disorder)  controlled with lexapro. No depression or HI/SI. Cont. the same. f/u in 3 mos    Orders:    escitalopram (Lexapro) 10 mg tablet; Take 1 tablet (10 mg) by mouth once daily.    Mild persistent reactive airway disease without complication ([facility])  Kenolog  40mg IM. Start antibiotics and albuterol inhaler  as above.  call office if symptoms do not improve in 3-4 days.    Orders:    triamcinolone acetonide (Kenalog-40) injection 40 mg    amoxicillin-clavulanate (Augmentin) 875-125 mg tablet; Take 1 tablet by mouth 2 times a day for 10 days.    albuterol (Ventolin HFA) 90 mcg/actuation inhaler; Inhale 2 puffs every 4 hours if needed for wheezing or shortness of breath.    Has patient received Tdap or Td within last 10 years? N  Is patient pregnant? N  Is patient 65 years old or older? N  Does patient have elevated temperature or feel sick today? N  Has patient had an allergy, severe reaction or illness from any vaccine? N  Does patient have an allergy to latex? N  Does patient have a progressive or unstable neurologic disorder, uncontrolled seizures or progressive encephalopathy? N

## 2025-06-09 ENCOUNTER — APPOINTMENT (OUTPATIENT)
Dept: PRIMARY CARE | Facility: CLINIC | Age: 55
End: 2025-06-09
Payer: COMMERCIAL

## 2025-06-24 ENCOUNTER — APPOINTMENT (OUTPATIENT)
Dept: GASTROENTEROLOGY | Facility: CLINIC | Age: 55
End: 2025-06-24
Payer: COMMERCIAL

## 2025-07-01 ENCOUNTER — APPOINTMENT (OUTPATIENT)
Dept: GASTROENTEROLOGY | Facility: CLINIC | Age: 55
End: 2025-07-01
Payer: COMMERCIAL

## 2025-07-30 ENCOUNTER — OFFICE VISIT (OUTPATIENT)
Dept: NEUROLOGY | Facility: HOSPITAL | Age: 55
End: 2025-07-30
Payer: COMMERCIAL

## 2025-07-30 VITALS
WEIGHT: 283 LBS | SYSTOLIC BLOOD PRESSURE: 144 MMHG | BODY MASS INDEX: 40.61 KG/M2 | DIASTOLIC BLOOD PRESSURE: 87 MMHG | HEART RATE: 75 BPM

## 2025-07-30 DIAGNOSIS — G43.109 MIGRAINE WITH AURA AND WITHOUT STATUS MIGRAINOSUS, NOT INTRACTABLE: Primary | ICD-10-CM

## 2025-07-30 DIAGNOSIS — R42 DIZZINESS: ICD-10-CM

## 2025-07-30 PROCEDURE — 99214 OFFICE O/P EST MOD 30 MIN: CPT | Performed by: PSYCHIATRY & NEUROLOGY

## 2025-07-30 RX ORDER — ERENUMAB-AOOE 140 MG/ML
140 INJECTION, SOLUTION SUBCUTANEOUS
Qty: 1 ML | Refills: 11 | Status: SHIPPED | OUTPATIENT
Start: 2025-07-30 | End: 2026-07-30

## 2025-07-30 ASSESSMENT — PAIN SCALES - GENERAL: PAINLEVEL_OUTOF10: 2

## 2025-07-30 NOTE — PATIENT INSTRUCTIONS
Start cGRP inhibitor Aimovig 140mg subcu q month--erx'd  2.   Stop topiramate  3.   Continue sumatriptan as needed  4.   Consider recheck B12 level later    Call on the day you inject for the first time, and we will schedule follow-up a little over a month out

## 2025-07-30 NOTE — PROGRESS NOTES
"Follow-up visit    Visit type: Follow-up    PCP: Gunjan Marcelo MD PhD.    Subjective     Sydney Juares is a 54 y.o. year old female here for follow-up. Last seen 5/19/2025.     Patient is accompanied by none.       Migraine     I first saw her 1/14/25 for dizziness. Noticed dizziness sometime in summer 2024. 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 No LOC. Felt like will pass out. Dizziness kept going on. Was feeling will fall backwards. Not lightheaded. Can't focus, have a real bad HA. Close eyes, will help. \"Dizzy medicine helps\". No spinning. Sometimes things in vision \"start to move\". Thinks has gotten worse over time. Was out of work due to symptoms x2-3 weeks. Subsided. Then came back. Not daily now. Lasts 45 seconds to 2-3 minutes. Always with HA. Feels like eyes hurt and forehead hurts and temples. Pressure. (-) sound/light sensitivity. (+) nausea. Has ? Visual aura vs floaters. Never had migraines. Used to have HA a lot with menses. Has not had eyes checked. Due for check-up--gets checked q2 years. No brain scan. No new meds. Just prescribed Lasix and duloxetine by PCP yesterday--hasn't picked up yet. No problems with hearing. No ringing. Had hx referred pain to R ear from dental work. (+) asthma (?). No kidney stones. No heart problems. No glaucoma. Smoker, 1ppd. No alcohol. No street drug use. I discussed poss vestibular migraine, to do MRI IAC wwo, check B12, try duloxetine prescribed by PCP, and vestibular therapy. B12 found borderline. I recommended starting B12 supplement and I let her know via portal messaging. MRI IAC wwo done was unremarkable, except non-specific subcortical WM disease. She did get evaluated by vestibular therapist, but cancelled followup appointments and was discharged. Last visit dizziness was somewhat better. I advised cut down/quit smoking, started B12 supplement, topiramate started. Last visit, was to continue topiramate 50mg daily and start " sumatriptan prn.    Since last visit, messaged my office saying she thinks she's having SE to topiramate. I advised trying topiramate 25mg bid, and followup in clinic if still with issues.    Here today for followup.    States when topiramate started she had itchy skin--though HA was being helped. She lowered dose to topiramate 25mg bid, still happening. Stopped topiramate 3 weeks ago--itchiness gone. But HA are bad, daily.    Sumatriptan helps.    (+) asthma--can't do beta blocker  Can't do SSRI--polypharmacy    --can't be sleepy      Patient Active Problem List   Diagnosis    Chronic pain of right knee    Dizziness    ROSALINDA (generalized anxiety disorder)    Unsteadiness on feet    Migraine with aura and without status migrainosus, not intractable    Abnormal brain MRI    Tobacco use disorder    B12 deficiency       No Known Allergies    Current Outpatient Medications:     albuterol (Ventolin HFA) 90 mcg/actuation inhaler, Inhale 2 puffs every 4 hours if needed for wheezing or shortness of breath., Disp: 8 g, Rfl: 0    escitalopram (Lexapro) 10 mg tablet, Take 1 tablet (10 mg) by mouth once daily., Disp: 90 tablet, Rfl: 0    furosemide (Lasix) 20 mg tablet, Take 1 tablet (20 mg) by mouth once daily., Disp: 30 tablet, Rfl: 2    MAGNESIUM MAL-POT CIT-TAUR-B6 ORAL, Take by mouth once daily., Disp: , Rfl:     meloxicam (Mobic) 15 mg tablet, Take 1 tablet (15 mg) by mouth once daily., Disp: 30 tablet, Rfl: 2    SUMAtriptan (Imitrex) 100 mg tablet, Take 1 tablet (100 mg) by mouth 1 time if needed for migraine (may repeat x1 in 2 hours if needed, no more than 2 doses in 24 hours)., Disp: 9 tablet, Rfl: 5    terbinafine (LamISIL) 250 mg tablet, Take 1 tablet (250 mg) by mouth once daily., Disp: 84 tablet, Rfl: 0    topiramate (Topamax) 50 mg tablet, Take 1 tablet (50 mg) by mouth once daily. (Patient not taking: Reported on 7/29/2025), Disp: 90 tablet, Rfl: 1     Objective     /87   Pulse 75   Wt 128  kg (283 lb)   BMI 40.61 kg/m²        Orthostatic VS negative 1/14/25   Dixhall pike negative on 1/14/25 but keeps head still on sitting up     Awake, alert, oriented x3, in no distress  Well-nourished, ambulatory independently     Mental status exam as above, conversant   Full EOMs intact, no nystagmus, no ptosis   No facial droop   Hearing grossly intact   No dysarthria     Motor strength is at least antigravity on all extremities  Normal gait     Lab Results   Component Value Date    WBC 5.5 09/24/2024    RBC 4.21 09/24/2024    HGB 14.3 09/24/2024    HCT 41.8 09/24/2024     09/24/2024     01/14/2025    K 4.6 01/14/2025     01/14/2025    BUN 13 01/14/2025    CREATININE 0.75 01/14/2025    EGFR >90 01/14/2025    CALCIUM 9.5 01/14/2025    ALKPHOS 60 01/14/2025    AST 14 01/14/2025    ALT 15 01/14/2025    MG 1.78 12/22/2019    VPVGKRSI81 289 01/14/2025    VITD25 12 (A) 12/27/2019    CHOL 231 (H) 12/27/2019    HDL 41.6 12/27/2019    TRIG 123 12/27/2019    TSH 1.82 09/24/2024        Assessment/Plan     Migraine with aura  Ddx vestibular migraine  MRI IAC wwo normal, except non-specific WM disease  Not previously diagnosed, not previously medicated for  S/p duloxetine by PCP for anxiety--was sedating with topiramate  On escitalopram by PCP  S/p topiramate--had itchiness--will add to allergy list  On sumatriptan 100mg prn, helping, continue  Discussed other options for migraine control:  (+) asthma--can't do beta blocker  Can't do SSRI--polypharmacy already  Discussed poss amitriptyline--but pt wary of being sleepy adore since she works as   Discussed poss use of cGRP inhibitor injection. Pt has tried and failed multiple prophylactic migraine meds   Discussed administration, possible SE; knows medication MAY NOT work  Discussed Aimovig or Emgality use  After much discussions, pt wants to try Aimovig--rx sent    Dizziness  B12 deficiency  Ddx: vestibular migraine vs vestibular neuritis vs  other  NOT lightheaded, NOT ataxia  Had vestibular therapy evaluation but did not follow-up  Took B12 supplement for only a few days then stopped     4. Abnormal brain MRI  Reviewed images, discussed ddx of WM spots  Not discussed today    5. Tobacco use disorder  Prev advised to quit  Not discussed today       Plans:  Start cGRP inhibitor Aimovig 140mg subcu q month--erx'd  2.   Stop topiramate  3.   Continue sumatriptan as needed  4.   Consider recheck B12 level later    Call on the day you inject for the first time, and we will schedule follow-up a little over a month out     All questions were answered.  Pt knows how to contact my office in case pt has any questions or concerns.    Celestino Knott MD

## 2025-07-31 ENCOUNTER — SPECIALTY PHARMACY (OUTPATIENT)
Dept: PHARMACY | Facility: CLINIC | Age: 55
End: 2025-07-31

## 2025-08-04 ENCOUNTER — SPECIALTY PHARMACY (OUTPATIENT)
Dept: PHARMACY | Facility: CLINIC | Age: 55
End: 2025-08-04

## 2025-08-04 ENCOUNTER — PHARMACY VISIT (OUTPATIENT)
Dept: PHARMACY | Facility: CLINIC | Age: 55
End: 2025-08-04
Payer: COMMERCIAL

## 2025-08-04 PROCEDURE — RXMED WILLOW AMBULATORY MEDICATION CHARGE

## 2025-08-06 ENCOUNTER — SPECIALTY PHARMACY (OUTPATIENT)
Dept: PHARMACY | Facility: CLINIC | Age: 55
End: 2025-08-06

## 2025-08-06 NOTE — PROGRESS NOTES
[facility] Clinical Note  Initial Patient Education     Introduction  Sydney Juares is a 54 y.o. female who is on the specialty pharmacy service for management of: Migraine Core.    Sydney Juares is initiating the following therapy: Aimovig 140 mg 1 pen under the skin every 28 days       Medication receipt date: 8/6/25    Duration of therapy: Maintenance    The most recent encounter visit with the referring prescriber Dr. Knott on 7/30/25 was reviewed.  Pharmacy will continue to collaborate in the care of this patient with the referring prescriber.    Clinical Background  An initial assessment was conducted prior to first fill of the medication to determine the appropriateness of therapy given the patient's diagnosis, medication list, comorbidities, allergies, medical history, patient's ability to self administer medication, and therapeutic goals based on possible outcomes of therapy. Refer to initial assessment task completed on 8/4/25.    Labs for clinical appropriateness that were reviewed include:   Neurology - Migraine - There are no routine laboratory monitoring parameters for this medication    Education/Discussion  Sydney was contacted on 8/6/2025 at 10:38 AM for a pharmacy visit with encounter number [id number] from:   [facility]  [address] [place] [id number]  Dept: [phone]  Dept Fax: [phone]  Sydney consented to a/an Telephone visit, which was performed.    Medication Start Date (planned or actual): 8/6/25         Education was conducted prior to start of therapy? Yes    Education discussed includes the following:  Patient Education  Counseled the Patient on the Following : Theraputic rationale and expected outcomes, Expected duration of therapy, Doses and administration, Adherence and missed doses, Possible side effects and management, Safe handling, storage, and disposal, Lab monitoring and follow-up, Possible drug  "interactions, Contraindications and precautions, Associated vaccinations, Pharmacy contact information  Learner: Patient  Education Method: Explanation  Education Response: Verbalizes understanding  Additional details of the medication specific counseling are found within the linked patient education flowsheet.     The follow up timeline was discussed. Every person responds to and reacts to therapy differently. Patient should be assessed for efficacy and tolerability in approximately: 3 months    Provided education on goals and possible outcomes of therapy:  Adherence with therapy  Timely completion of appropriate labs  Timely and appropriate follow up with provider  Identify and address medication interactions with presciption medications, OTC medications and supplements  Optimize or maintain quality of life  Neurology- Migraine: 50% reduction in migraine days each month from baseline  Decreased use of \"prn\" agents to treat migraine headaches  Improvement in MIDAS score from baseline           The importance of adherence was discussed and they were advised to take the medication as prescribed by their provider.         Impression/Plan  Review and Assessment   Reviewed During This Encounter: Allergies, Medications, Problem list, Immunizations  Medications Assessed for Appropriate Use, Dose, Route, Frequency, and Duration: Yes  Medication Reconciliation Completed: Yes  Drug Interactions Evaluated: Yes  Clinically Relevant Drug Interactions Identified: Yes    This patient has not been identified as high risk due to Lack of high risk qualifiers.  The following action was taken: N/A.    QOL/Patient Satisfaction  Rate your quality of life on scale of 1-10: 3  Rate your satisfaction with  Specialty Pharmacy on scale of 1-10: 10 - Completely satisfied    The  Specialty Pharmacy Welcome packet may be viewed here:   Specialty Pharmacy Welcome Packet     Or by scanning QR code:      Provided contact information " ([phone]) for [facility] and reviewed dispensing process, refill timeline and patient management follow up. Advised to contact the pharmacy if there are any adverse effects and/or changes to medication list, including prescriptions, OTC medications, herbal products, or supplements. Confirmed understanding of education conducted during assessment. All questions and concerns were addressed and patient was encouraged to reach out for additional questions or concerns.      Damaris A Weiland, JillD

## 2025-08-29 ENCOUNTER — SPECIALTY PHARMACY (OUTPATIENT)
Dept: PHARMACY | Facility: CLINIC | Age: 55
End: 2025-08-29

## 2025-08-29 PROCEDURE — RXMED WILLOW AMBULATORY MEDICATION CHARGE

## 2025-09-02 ENCOUNTER — PHARMACY VISIT (OUTPATIENT)
Dept: PHARMACY | Facility: CLINIC | Age: 55
End: 2025-09-02
Payer: COMMERCIAL

## 2025-09-03 ENCOUNTER — APPOINTMENT (OUTPATIENT)
Dept: PRIMARY CARE | Facility: CLINIC | Age: 55
End: 2025-09-03
Payer: COMMERCIAL

## 2025-09-09 ENCOUNTER — APPOINTMENT (OUTPATIENT)
Dept: NEUROLOGY | Facility: HOSPITAL | Age: 55
End: 2025-09-09
Payer: COMMERCIAL